# Patient Record
Sex: FEMALE | Race: WHITE | NOT HISPANIC OR LATINO | Employment: OTHER | URBAN - NONMETROPOLITAN AREA
[De-identification: names, ages, dates, MRNs, and addresses within clinical notes are randomized per-mention and may not be internally consistent; named-entity substitution may affect disease eponyms.]

---

## 2017-04-10 ENCOUNTER — DOCTOR'S OFFICE (OUTPATIENT)
Dept: URBAN - NONMETROPOLITAN AREA CLINIC 1 | Facility: CLINIC | Age: 82
Setting detail: OPHTHALMOLOGY
End: 2017-04-10
Payer: COMMERCIAL

## 2017-04-10 DIAGNOSIS — H35.3231: ICD-10-CM

## 2017-04-10 DIAGNOSIS — H04.121: ICD-10-CM

## 2017-04-10 DIAGNOSIS — H53.123: ICD-10-CM

## 2017-04-10 DIAGNOSIS — H35.053: ICD-10-CM

## 2017-04-10 DIAGNOSIS — H04.122: ICD-10-CM

## 2017-04-10 PROCEDURE — 92134 CPTRZ OPH DX IMG PST SGM RTA: CPT | Performed by: OPHTHALMOLOGY

## 2017-04-10 PROCEDURE — 92014 COMPRE OPH EXAM EST PT 1/>: CPT | Performed by: OPHTHALMOLOGY

## 2017-04-10 ASSESSMENT — REFRACTION_MANIFEST
OD_ADD: +3.00
OS_VA2: 20/
OD_VA2: 20/
OS_VA3: 20/
OS_SPHERE: PLANO
OS_VA2: 20/
OD_VA3: 20/
OS_AXIS: 090
OD_VA1: 20/40
OS_VA2: 20/60+2
OD_VA3: 20/
OS_VA1: 20/
OS_VA3: 20/
OD_VA2: 20/40
OD_VA1: 20/
OD_VA2: 20/
OU_VA: 20/
OU_VA: 20/
OD_VA3: 20/
OS_VA3: 20/
OS_VA1: 20/
OU_VA: 20/
OS_VA1: 20/60+2
OD_SPHERE: +0.25
OD_CYLINDER: -0.50
OS_CYLINDER: -0.50
OD_AXIS: 020
OD_VA1: 20/
OS_ADD: +3.00

## 2017-04-10 ASSESSMENT — REFRACTION_AUTOREFRACTION
OD_CYLINDER: -1.00
OS_CYLINDER: -0.75
OD_SPHERE: +1.00
OS_AXIS: 104
OS_SPHERE: -0.50
OD_AXIS: 20

## 2017-04-10 ASSESSMENT — VISUAL ACUITY
OS_BCVA: 20/50-2
OD_BCVA: 20/150

## 2017-04-10 ASSESSMENT — REFRACTION_CURRENTRX
OD_OVR_VA: 20/
OD_OVR_VA: 20/
OD_CYLINDER: -0.50
OS_OVR_VA: 20/
OS_AXIS: 180
OS_OVR_VA: 20/
OS_SPHERE: 0.00
OD_VPRISM_DIRECTION: BF
OS_VPRISM_DIRECTION: BF
OS_CYLINDER: 0.00
OS_OVR_VA: 20/
OD_OVR_VA: 20/
OD_SPHERE: +0.50
OD_AXIS: 30
OD_ADD: +2.75
OS_ADD: +2.75

## 2017-04-10 ASSESSMENT — SUPERFICIAL PUNCTATE KERATITIS (SPK)
OD_SPK: T
OS_SPK: T

## 2017-04-10 ASSESSMENT — LID POSITION - PTOSIS: OS_PTOSIS: +1

## 2017-04-10 ASSESSMENT — SPHEQUIV_DERIVED
OS_SPHEQUIV: -0.875
OD_SPHEQUIV: 0
OD_SPHEQUIV: 0.5

## 2017-04-10 ASSESSMENT — CONFRONTATIONAL VISUAL FIELD TEST (CVF)
OD_FINDINGS: FULL
OS_FINDINGS: FULL

## 2017-04-13 ENCOUNTER — DOCTOR'S OFFICE (OUTPATIENT)
Dept: URBAN - NONMETROPOLITAN AREA CLINIC 1 | Facility: CLINIC | Age: 82
Setting detail: OPHTHALMOLOGY
End: 2017-04-13
Payer: COMMERCIAL

## 2017-04-13 DIAGNOSIS — H53.123: ICD-10-CM

## 2017-04-13 PROCEDURE — 92083 EXTENDED VISUAL FIELD XM: CPT | Performed by: OPHTHALMOLOGY

## 2017-04-17 ENCOUNTER — DOCTOR'S OFFICE (OUTPATIENT)
Dept: URBAN - NONMETROPOLITAN AREA CLINIC 1 | Facility: CLINIC | Age: 82
Setting detail: OPHTHALMOLOGY
End: 2017-04-17
Payer: COMMERCIAL

## 2017-04-17 DIAGNOSIS — H43.811: ICD-10-CM

## 2017-04-17 DIAGNOSIS — H35.053: ICD-10-CM

## 2017-04-17 DIAGNOSIS — H43.812: ICD-10-CM

## 2017-04-17 DIAGNOSIS — H35.3231: ICD-10-CM

## 2017-04-17 DIAGNOSIS — H04.122: ICD-10-CM

## 2017-04-17 DIAGNOSIS — H04.121: ICD-10-CM

## 2017-04-17 PROCEDURE — 92235 FLUORESCEIN ANGRPH MLTIFRAME: CPT | Performed by: OPHTHALMOLOGY

## 2017-04-17 PROCEDURE — 92014 COMPRE OPH EXAM EST PT 1/>: CPT | Performed by: OPHTHALMOLOGY

## 2017-04-17 PROCEDURE — 92226 OPHTHALMOSCOPY EXT SUBSEQUENT: CPT | Performed by: OPHTHALMOLOGY

## 2017-04-17 PROCEDURE — 92250 FUNDUS PHOTOGRAPHY W/I&R: CPT | Performed by: OPHTHALMOLOGY

## 2017-04-17 ASSESSMENT — REFRACTION_MANIFEST
OS_CYLINDER: -0.50
OS_VA2: 20/60+2
OD_VA1: 20/40
OS_VA3: 20/
OS_AXIS: 090
OU_VA: 20/
OD_ADD: +3.00
OD_VA2: 20/
OD_AXIS: 020
OS_VA2: 20/
OS_VA3: 20/
OD_VA2: 20/40
OD_VA3: 20/
OS_ADD: +3.00
OS_SPHERE: PLANO
OU_VA: 20/
OD_VA2: 20/
OS_VA2: 20/
OD_CYLINDER: -0.50
OS_VA3: 20/
OS_VA1: 20/
OU_VA: 20/
OS_VA1: 20/60+2
OD_SPHERE: +0.25
OS_VA1: 20/
OD_VA1: 20/
OD_VA1: 20/
OD_VA3: 20/
OD_VA3: 20/

## 2017-04-17 ASSESSMENT — REFRACTION_CURRENTRX
OD_VPRISM_DIRECTION: BF
OS_VPRISM_DIRECTION: BF
OS_SPHERE: 0.00
OD_OVR_VA: 20/
OS_CYLINDER: 0.00
OS_OVR_VA: 20/
OD_OVR_VA: 20/
OD_CYLINDER: -0.50
OD_AXIS: 30
OD_ADD: +2.75
OS_OVR_VA: 20/
OS_ADD: +2.75
OD_OVR_VA: 20/
OS_OVR_VA: 20/
OD_SPHERE: +0.50
OS_AXIS: 180

## 2017-04-17 ASSESSMENT — LID POSITION - PTOSIS: OS_PTOSIS: +1

## 2017-04-17 ASSESSMENT — REFRACTION_AUTOREFRACTION
OD_AXIS: 20
OS_CYLINDER: -0.75
OS_AXIS: 104
OS_SPHERE: -0.50
OD_SPHERE: +1.00
OD_CYLINDER: -1.00

## 2017-04-17 ASSESSMENT — SPHEQUIV_DERIVED
OS_SPHEQUIV: -0.875
OD_SPHEQUIV: 0.5
OD_SPHEQUIV: 0

## 2017-04-17 ASSESSMENT — SUPERFICIAL PUNCTATE KERATITIS (SPK)
OS_SPK: T
OD_SPK: T

## 2017-04-17 ASSESSMENT — VISUAL ACUITY
OS_BCVA: 20/30-2
OD_BCVA: 20/80-1

## 2017-04-17 ASSESSMENT — CONFRONTATIONAL VISUAL FIELD TEST (CVF)
OD_FINDINGS: FULL
OS_FINDINGS: FULL

## 2017-05-18 ENCOUNTER — RX ONLY (RX ONLY)
Age: 82
End: 2017-05-18

## 2017-05-18 ENCOUNTER — DOCTOR'S OFFICE (OUTPATIENT)
Dept: URBAN - NONMETROPOLITAN AREA CLINIC 1 | Facility: CLINIC | Age: 82
Setting detail: OPHTHALMOLOGY
End: 2017-05-18
Payer: COMMERCIAL

## 2017-05-18 DIAGNOSIS — H35.053: ICD-10-CM

## 2017-05-18 DIAGNOSIS — H35.3231: ICD-10-CM

## 2017-05-18 DIAGNOSIS — H04.121: ICD-10-CM

## 2017-05-18 DIAGNOSIS — H43.811: ICD-10-CM

## 2017-05-18 DIAGNOSIS — H04.122: ICD-10-CM

## 2017-05-18 DIAGNOSIS — H53.123: ICD-10-CM

## 2017-05-18 DIAGNOSIS — H43.812: ICD-10-CM

## 2017-05-18 PROCEDURE — 92134 CPTRZ OPH DX IMG PST SGM RTA: CPT | Performed by: OPHTHALMOLOGY

## 2017-05-18 PROCEDURE — 92240 ICG ANGIOGRAPHY I&R UNI/BI: CPT | Performed by: OPHTHALMOLOGY

## 2017-05-18 PROCEDURE — 92014 COMPRE OPH EXAM EST PT 1/>: CPT | Performed by: OPHTHALMOLOGY

## 2017-05-18 ASSESSMENT — REFRACTION_MANIFEST
OD_VA2: 20/
OU_VA: 20/
OS_VA1: 20/
OS_VA2: 20/
OS_AXIS: 090
OD_VA2: 20/40
OD_VA1: 20/
OS_VA3: 20/
OS_VA1: 20/
OS_VA1: 20/60+2
OS_CYLINDER: -0.50
OD_AXIS: 020
OS_VA3: 20/
OS_VA3: 20/
OS_VA2: 20/
OD_VA3: 20/
OD_SPHERE: +0.25
OD_VA2: 20/
OS_ADD: +3.00
OD_VA3: 20/
OD_VA1: 20/40
OD_CYLINDER: -0.50
OU_VA: 20/
OD_ADD: +3.00
OU_VA: 20/
OD_VA1: 20/
OD_VA3: 20/
OS_VA2: 20/60+2
OS_SPHERE: PLANO

## 2017-05-18 ASSESSMENT — REFRACTION_CURRENTRX
OS_OVR_VA: 20/
OD_SPHERE: +0.50
OD_VPRISM_DIRECTION: BF
OD_ADD: +2.75
OS_VPRISM_DIRECTION: BF
OS_OVR_VA: 20/
OD_AXIS: 30
OD_CYLINDER: -0.50
OS_CYLINDER: 0.00
OD_OVR_VA: 20/
OS_ADD: +2.75
OS_OVR_VA: 20/
OD_OVR_VA: 20/
OS_SPHERE: 0.00
OS_AXIS: 180
OD_OVR_VA: 20/

## 2017-05-18 ASSESSMENT — CONFRONTATIONAL VISUAL FIELD TEST (CVF)
OD_FINDINGS: FULL
OS_FINDINGS: FULL

## 2017-05-18 ASSESSMENT — LID POSITION - PTOSIS: OS_PTOSIS: +1

## 2017-05-18 ASSESSMENT — REFRACTION_AUTOREFRACTION
OS_SPHERE: -0.50
OS_CYLINDER: -0.75
OD_CYLINDER: -1.00
OS_AXIS: 104
OD_SPHERE: +1.00
OD_AXIS: 20

## 2017-05-18 ASSESSMENT — SPHEQUIV_DERIVED
OD_SPHEQUIV: 0.5
OS_SPHEQUIV: -0.875
OD_SPHEQUIV: 0

## 2017-05-18 ASSESSMENT — SUPERFICIAL PUNCTATE KERATITIS (SPK)
OS_SPK: T
OD_SPK: T

## 2017-05-18 ASSESSMENT — VISUAL ACUITY
OD_BCVA: 20/200
OS_BCVA: 20/40-2

## 2017-05-19 ENCOUNTER — DOCTOR'S OFFICE (OUTPATIENT)
Dept: URBAN - NONMETROPOLITAN AREA CLINIC 1 | Facility: CLINIC | Age: 82
Setting detail: OPHTHALMOLOGY
End: 2017-05-19
Payer: COMMERCIAL

## 2017-05-19 DIAGNOSIS — H35.053: ICD-10-CM

## 2017-05-19 DIAGNOSIS — H35.3231: ICD-10-CM

## 2017-05-19 PROCEDURE — 67028 INJECTION EYE DRUG: CPT | Performed by: OPHTHALMOLOGY

## 2017-05-19 ASSESSMENT — VISUAL ACUITY
OS_BCVA: 20/40-2
OD_BCVA: 20/200

## 2017-05-19 ASSESSMENT — REFRACTION_MANIFEST
OU_VA: 20/
OD_VA1: 20/40
OD_VA3: 20/
OD_CYLINDER: -0.50
OU_VA: 20/
OD_SPHERE: +0.25
OD_VA1: 20/
OS_VA1: 20/60+2
OS_VA3: 20/
OD_VA3: 20/
OS_AXIS: 090
OS_VA1: 20/
OD_VA3: 20/
OD_AXIS: 020
OS_SPHERE: PLANO
OD_VA2: 20/
OD_VA2: 20/40
OS_VA1: 20/
OD_VA1: 20/
OS_VA3: 20/
OD_VA2: 20/
OD_ADD: +3.00
OU_VA: 20/
OS_VA2: 20/60+2
OS_VA2: 20/
OS_VA3: 20/
OS_VA2: 20/
OS_CYLINDER: -0.50
OS_ADD: +3.00

## 2017-05-19 ASSESSMENT — REFRACTION_CURRENTRX
OD_OVR_VA: 20/
OD_OVR_VA: 20/
OS_SPHERE: 0.00
OS_AXIS: 180
OS_CYLINDER: 0.00
OD_VPRISM_DIRECTION: BF
OS_ADD: +2.75
OD_AXIS: 30
OS_OVR_VA: 20/
OD_ADD: +2.75
OS_OVR_VA: 20/
OS_OVR_VA: 20/
OD_OVR_VA: 20/
OD_CYLINDER: -0.50
OD_SPHERE: +0.50
OS_VPRISM_DIRECTION: BF

## 2017-05-19 ASSESSMENT — REFRACTION_AUTOREFRACTION
OD_CYLINDER: -1.00
OS_SPHERE: -0.50
OD_AXIS: 20
OD_SPHERE: +1.00
OS_CYLINDER: -0.75
OS_AXIS: 104

## 2017-05-19 ASSESSMENT — SPHEQUIV_DERIVED
OS_SPHEQUIV: -0.875
OD_SPHEQUIV: 0
OD_SPHEQUIV: 0.5

## 2017-06-19 ENCOUNTER — DOCTOR'S OFFICE (OUTPATIENT)
Dept: URBAN - NONMETROPOLITAN AREA CLINIC 1 | Facility: CLINIC | Age: 82
Setting detail: OPHTHALMOLOGY
End: 2017-06-19
Payer: COMMERCIAL

## 2017-06-19 DIAGNOSIS — H35.3231: ICD-10-CM

## 2017-06-19 DIAGNOSIS — H35.053: ICD-10-CM

## 2017-06-19 PROCEDURE — 92014 COMPRE OPH EXAM EST PT 1/>: CPT | Performed by: OPHTHALMOLOGY

## 2017-06-19 PROCEDURE — 92134 CPTRZ OPH DX IMG PST SGM RTA: CPT | Performed by: OPHTHALMOLOGY

## 2017-06-19 ASSESSMENT — REFRACTION_MANIFEST
OD_CYLINDER: -0.50
OS_SPHERE: PLANO
OS_VA3: 20/
OD_ADD: +3.00
OS_VA3: 20/
OU_VA: 20/
OD_VA1: 20/40
OD_VA2: 20/40
OU_VA: 20/
OS_VA3: 20/
OD_VA1: 20/
OS_ADD: +3.00
OS_VA1: 20/60+2
OS_VA2: 20/
OS_CYLINDER: -0.50
OS_VA1: 20/
OS_VA1: 20/
OS_VA2: 20/
OD_VA3: 20/
OD_VA3: 20/
OD_VA1: 20/
OD_VA2: 20/
OS_VA2: 20/60+2
OD_SPHERE: +0.25
OS_AXIS: 090
OD_VA3: 20/
OU_VA: 20/
OD_AXIS: 020
OD_VA2: 20/

## 2017-06-19 ASSESSMENT — REFRACTION_CURRENTRX
OS_AXIS: 180
OD_OVR_VA: 20/
OS_OVR_VA: 20/
OS_OVR_VA: 20/
OS_ADD: +2.75
OS_VPRISM_DIRECTION: BF
OD_CYLINDER: -0.50
OD_VPRISM_DIRECTION: BF
OD_OVR_VA: 20/
OD_AXIS: 30
OS_OVR_VA: 20/
OD_ADD: +2.75
OS_SPHERE: 0.00
OD_OVR_VA: 20/
OD_SPHERE: +0.50
OS_CYLINDER: 0.00

## 2017-06-19 ASSESSMENT — LID POSITION - PTOSIS: OS_PTOSIS: +1

## 2017-06-19 ASSESSMENT — REFRACTION_AUTOREFRACTION
OD_SPHERE: +1.00
OS_AXIS: 104
OS_CYLINDER: -0.75
OD_CYLINDER: -1.00
OD_AXIS: 20
OS_SPHERE: -0.50

## 2017-06-19 ASSESSMENT — SUPERFICIAL PUNCTATE KERATITIS (SPK)
OD_SPK: T
OS_SPK: T

## 2017-06-19 ASSESSMENT — SPHEQUIV_DERIVED
OS_SPHEQUIV: -0.875
OD_SPHEQUIV: 0.5
OD_SPHEQUIV: 0

## 2017-06-19 ASSESSMENT — CONFRONTATIONAL VISUAL FIELD TEST (CVF)
OD_FINDINGS: FULL
OS_FINDINGS: FULL

## 2017-06-19 ASSESSMENT — VISUAL ACUITY
OD_BCVA: 20/60
OS_BCVA: 20/25

## 2017-06-22 ENCOUNTER — DOCTOR'S OFFICE (OUTPATIENT)
Dept: URBAN - NONMETROPOLITAN AREA CLINIC 1 | Facility: CLINIC | Age: 82
Setting detail: OPHTHALMOLOGY
End: 2017-06-22
Payer: COMMERCIAL

## 2017-06-22 DIAGNOSIS — H35.3221: ICD-10-CM

## 2017-06-22 PROCEDURE — 67028 INJECTION EYE DRUG: CPT | Performed by: OPHTHALMOLOGY

## 2017-06-22 ASSESSMENT — REFRACTION_AUTOREFRACTION
OS_CYLINDER: -0.75
OD_CYLINDER: -1.00
OS_AXIS: 104
OD_AXIS: 20
OS_SPHERE: -0.50
OD_SPHERE: +1.00

## 2017-06-22 ASSESSMENT — SPHEQUIV_DERIVED
OS_SPHEQUIV: -0.875
OD_SPHEQUIV: 0.5

## 2017-06-22 ASSESSMENT — VISUAL ACUITY
OD_BCVA: 20/60
OS_BCVA: 20/25

## 2017-07-21 ENCOUNTER — DOCTOR'S OFFICE (OUTPATIENT)
Dept: URBAN - NONMETROPOLITAN AREA CLINIC 1 | Facility: CLINIC | Age: 82
Setting detail: OPHTHALMOLOGY
End: 2017-07-21
Payer: COMMERCIAL

## 2017-07-21 DIAGNOSIS — H35.3221: ICD-10-CM

## 2017-07-21 DIAGNOSIS — H35.3211: ICD-10-CM

## 2017-07-21 DIAGNOSIS — H43.812: ICD-10-CM

## 2017-07-21 DIAGNOSIS — H35.053: ICD-10-CM

## 2017-07-21 DIAGNOSIS — H43.811: ICD-10-CM

## 2017-07-21 PROCEDURE — 92226 OPHTHALMOSCOPY EXT SUBSEQUENT: CPT | Performed by: OPHTHALMOLOGY

## 2017-07-21 PROCEDURE — 92014 COMPRE OPH EXAM EST PT 1/>: CPT | Performed by: OPHTHALMOLOGY

## 2017-07-21 PROCEDURE — 92134 CPTRZ OPH DX IMG PST SGM RTA: CPT | Performed by: OPHTHALMOLOGY

## 2017-07-21 ASSESSMENT — REFRACTION_MANIFEST
OS_VA2: 20/
OS_VA3: 20/
OS_VA1: 20/
OS_VA3: 20/
OD_VA3: 20/
OS_VA2: 20/
OD_VA2: 20/
OD_VA2: 20/
OD_VA1: 20/
OS_VA2: 20/
OS_VA1: 20/
OU_VA: 20/
OU_VA: 20/
OD_VA1: 20/
OU_VA: 20/
OS_VA1: 20/
OD_VA3: 20/
OD_VA3: 20/
OS_VA3: 20/
OD_VA2: 20/
OD_VA1: 20/

## 2017-07-21 ASSESSMENT — REFRACTION_CURRENTRX
OS_OVR_VA: 20/
OD_OVR_VA: 20/

## 2017-07-21 ASSESSMENT — REFRACTION_AUTOREFRACTION
OS_CYLINDER: -0.75
OD_CYLINDER: -1.00
OD_AXIS: 20
OD_SPHERE: +1.00
OS_AXIS: 104
OS_SPHERE: -0.50

## 2017-07-21 ASSESSMENT — VISUAL ACUITY
OS_BCVA: 20/50+2
OD_BCVA: 20/200-1

## 2017-07-21 ASSESSMENT — SPHEQUIV_DERIVED
OS_SPHEQUIV: -0.875
OD_SPHEQUIV: 0.5

## 2017-07-21 ASSESSMENT — LID POSITION - PTOSIS: OS_PTOSIS: +1

## 2017-07-21 ASSESSMENT — CONFRONTATIONAL VISUAL FIELD TEST (CVF)
OS_FINDINGS: FULL
OD_FINDINGS: FULL

## 2017-07-21 ASSESSMENT — SUPERFICIAL PUNCTATE KERATITIS (SPK)
OD_SPK: T
OS_SPK: T

## 2017-07-24 ENCOUNTER — DOCTOR'S OFFICE (OUTPATIENT)
Dept: URBAN - NONMETROPOLITAN AREA CLINIC 1 | Facility: CLINIC | Age: 82
Setting detail: OPHTHALMOLOGY
End: 2017-07-24
Payer: COMMERCIAL

## 2017-07-24 DIAGNOSIS — H35.3221: ICD-10-CM

## 2017-07-24 PROCEDURE — 67028 INJECTION EYE DRUG: CPT | Performed by: OPHTHALMOLOGY

## 2017-07-24 ASSESSMENT — REFRACTION_AUTOREFRACTION
OD_AXIS: 20
OD_SPHERE: +1.00
OD_CYLINDER: -1.00
OS_SPHERE: -0.50
OS_CYLINDER: -0.75
OS_AXIS: 104

## 2017-07-24 ASSESSMENT — VISUAL ACUITY
OD_BCVA: 20/200-1
OS_BCVA: 20/50+2

## 2017-07-24 ASSESSMENT — SPHEQUIV_DERIVED
OD_SPHEQUIV: 0.5
OS_SPHEQUIV: -0.875

## 2017-09-14 ENCOUNTER — DOCTOR'S OFFICE (OUTPATIENT)
Dept: URBAN - NONMETROPOLITAN AREA CLINIC 1 | Facility: CLINIC | Age: 82
Setting detail: OPHTHALMOLOGY
End: 2017-09-14
Payer: COMMERCIAL

## 2017-09-14 DIAGNOSIS — H35.053: ICD-10-CM

## 2017-09-14 DIAGNOSIS — H43.812: ICD-10-CM

## 2017-09-14 DIAGNOSIS — H43.813: ICD-10-CM

## 2017-09-14 DIAGNOSIS — H35.3231: ICD-10-CM

## 2017-09-14 DIAGNOSIS — H43.811: ICD-10-CM

## 2017-09-14 PROCEDURE — 92134 CPTRZ OPH DX IMG PST SGM RTA: CPT | Performed by: OPHTHALMOLOGY

## 2017-09-14 PROCEDURE — 92226 OPHTHALMOSCOPY EXT SUBSEQUENT: CPT | Performed by: OPHTHALMOLOGY

## 2017-09-14 PROCEDURE — 99214 OFFICE O/P EST MOD 30 MIN: CPT | Performed by: OPHTHALMOLOGY

## 2017-09-14 ASSESSMENT — CONFRONTATIONAL VISUAL FIELD TEST (CVF)
OS_FINDINGS: FULL
OD_FINDINGS: FULL

## 2017-09-14 ASSESSMENT — SUPERFICIAL PUNCTATE KERATITIS (SPK)
OD_SPK: T
OS_SPK: T

## 2017-09-14 ASSESSMENT — LID POSITION - PTOSIS: OS_PTOSIS: +1

## 2017-09-15 ENCOUNTER — DOCTOR'S OFFICE (OUTPATIENT)
Dept: URBAN - NONMETROPOLITAN AREA CLINIC 1 | Facility: CLINIC | Age: 82
Setting detail: OPHTHALMOLOGY
End: 2017-09-15
Payer: COMMERCIAL

## 2017-09-15 DIAGNOSIS — H35.3221: ICD-10-CM

## 2017-09-15 DIAGNOSIS — H35.3231: ICD-10-CM

## 2017-09-15 PROCEDURE — 67028 INJECTION EYE DRUG: CPT | Performed by: OPHTHALMOLOGY

## 2017-09-15 PROCEDURE — 92235 FLUORESCEIN ANGRPH MLTIFRAME: CPT | Performed by: OPHTHALMOLOGY

## 2017-09-15 PROCEDURE — 92250 FUNDUS PHOTOGRAPHY W/I&R: CPT | Performed by: OPHTHALMOLOGY

## 2017-09-15 ASSESSMENT — SPHEQUIV_DERIVED
OD_SPHEQUIV: 0.5
OS_SPHEQUIV: -0.875
OD_SPHEQUIV: 0.5
OD_SPHEQUIV: 0.5

## 2017-09-15 ASSESSMENT — REFRACTION_MANIFEST
OU_VA: 20/
OD_VA1: 20/
OS_VA3: 20/
OS_VA2: 20/
OS_VA1: 20/
OU_VA: 20/
OS_VA2: 20/
OS_VA1: 20/
OD_VA2: 20/
OS_VA3: 20/
OS_VA3: 20/
OD_VA3: 20/
OS_VA1: 20/
OD_VA3: 20/
OU_VA: 20/
OD_VA2: 20/
OS_VA2: 20/
OD_VA1: 20/
OD_VA1: 20/
OS_VA1: 20/
OS_VA2: 20/
OD_VA1: 20/
OD_VA2: 20/
OS_VA2: 20/
OS_VA1: 20/
OU_VA: 20/
OD_VA2: 20/
OS_VA3: 20/
OD_VA1: 20/
OD_VA3: 20/
OS_VA3: 20/
OU_VA: 20/
OD_VA1: 20/
OS_VA1: 20/
OD_VA2: 20/
OD_VA2: 20/
OS_VA3: 20/
OS_VA2: 20/
OD_VA3: 20/
OU_VA: 20/

## 2017-09-15 ASSESSMENT — REFRACTION_AUTOREFRACTION
OS_CYLINDER: -0.75
OS_CYLINDER: -0.75
OD_SPHERE: +1.00
OD_CYLINDER: -1.00
OS_SPHERE: -0.50
OS_CYLINDER: -0.75
OD_SPHERE: +1.00
OD_AXIS: 20
OD_AXIS: 20
OS_SPHERE: -0.50
OD_SPHERE: +1.00
OS_SPHERE: -0.50
OS_AXIS: 104
OD_AXIS: 20
OD_CYLINDER: -1.00
OS_AXIS: 104
OS_AXIS: 104
OD_CYLINDER: -1.00

## 2017-09-15 ASSESSMENT — REFRACTION_CURRENTRX
OS_OVR_VA: 20/
OD_OVR_VA: 20/
OS_OVR_VA: 20/
OD_OVR_VA: 20/
OS_OVR_VA: 20/
OD_OVR_VA: 20/
OD_OVR_VA: 20/
OS_OVR_VA: 20/
OS_OVR_VA: 20/
OD_OVR_VA: 20/
OD_OVR_VA: 20/
OS_OVR_VA: 20/

## 2017-09-15 ASSESSMENT — VISUAL ACUITY
OD_BCVA: CF 6FT
OD_BCVA: CF 6FT
OS_BCVA: 20/60+2
OD_BCVA: CF 6FT
OS_BCVA: 20/60+2
OS_BCVA: 20/60+2

## 2018-03-30 ENCOUNTER — DOCTOR'S OFFICE (OUTPATIENT)
Dept: URBAN - NONMETROPOLITAN AREA CLINIC 1 | Facility: CLINIC | Age: 83
Setting detail: OPHTHALMOLOGY
End: 2018-03-30
Payer: COMMERCIAL

## 2018-03-30 DIAGNOSIS — H35.3231: ICD-10-CM

## 2018-03-30 DIAGNOSIS — H43.813: ICD-10-CM

## 2018-03-30 DIAGNOSIS — H35.053: ICD-10-CM

## 2018-03-30 DIAGNOSIS — H43.812: ICD-10-CM

## 2018-03-30 DIAGNOSIS — H04.123: ICD-10-CM

## 2018-03-30 DIAGNOSIS — H43.811: ICD-10-CM

## 2018-03-30 PROCEDURE — 92226 OPHTHALMOSCOPY EXT SUBSEQUENT: CPT | Performed by: OPHTHALMOLOGY

## 2018-03-30 PROCEDURE — 92134 CPTRZ OPH DX IMG PST SGM RTA: CPT | Performed by: OPHTHALMOLOGY

## 2018-03-30 PROCEDURE — 99214 OFFICE O/P EST MOD 30 MIN: CPT | Performed by: OPHTHALMOLOGY

## 2018-03-30 ASSESSMENT — VISUAL ACUITY
OS_BCVA: 20/80
OD_BCVA: CF 6FT

## 2018-03-30 ASSESSMENT — CONFRONTATIONAL VISUAL FIELD TEST (CVF)
OD_FINDINGS: FULL
OS_FINDINGS: FULL

## 2018-03-30 ASSESSMENT — REFRACTION_MANIFEST
OS_VA2: 20/
OD_VA3: 20/
OS_VA1: 20/
OD_VA1: 20/
OS_VA2: 20/
OD_VA3: 20/
OU_VA: 20/
OS_VA2: 20/
OD_VA1: 20/
OS_VA1: 20/
OU_VA: 20/
OD_VA2: 20/
OD_VA1: 20/
OS_VA3: 20/
OU_VA: 20/
OS_VA3: 20/
OS_VA1: 20/
OS_VA3: 20/
OD_VA3: 20/
OD_VA2: 20/
OD_VA2: 20/

## 2018-03-30 ASSESSMENT — REFRACTION_CURRENTRX
OS_OVR_VA: 20/
OD_OVR_VA: 20/
OD_OVR_VA: 20/
OS_OVR_VA: 20/
OD_OVR_VA: 20/
OS_OVR_VA: 20/

## 2018-03-30 ASSESSMENT — REFRACTION_AUTOREFRACTION
OD_CYLINDER: -1.00
OS_CYLINDER: -0.75
OD_AXIS: 20
OS_AXIS: 104
OS_SPHERE: -0.50
OD_SPHERE: +1.00

## 2018-03-30 ASSESSMENT — SPHEQUIV_DERIVED
OS_SPHEQUIV: -0.875
OD_SPHEQUIV: 0.5

## 2018-03-30 ASSESSMENT — SUPERFICIAL PUNCTATE KERATITIS (SPK)
OD_SPK: T
OS_SPK: T

## 2018-03-30 ASSESSMENT — LID POSITION - PTOSIS: OS_PTOSIS: +1

## 2018-04-06 ENCOUNTER — DOCTOR'S OFFICE (OUTPATIENT)
Dept: URBAN - NONMETROPOLITAN AREA CLINIC 1 | Facility: CLINIC | Age: 83
Setting detail: OPHTHALMOLOGY
End: 2018-04-06
Payer: COMMERCIAL

## 2018-04-06 DIAGNOSIS — H35.3231: ICD-10-CM

## 2018-04-06 DIAGNOSIS — H35.3221: ICD-10-CM

## 2018-04-06 DIAGNOSIS — H35.053: ICD-10-CM

## 2018-04-06 PROCEDURE — 92235 FLUORESCEIN ANGRPH MLTIFRAME: CPT | Performed by: OPHTHALMOLOGY

## 2018-04-06 PROCEDURE — 67028 INJECTION EYE DRUG: CPT | Performed by: OPHTHALMOLOGY

## 2018-04-06 PROCEDURE — 92250 FUNDUS PHOTOGRAPHY W/I&R: CPT | Performed by: OPHTHALMOLOGY

## 2018-04-06 ASSESSMENT — LID POSITION - PTOSIS: OS_PTOSIS: +1

## 2018-04-06 ASSESSMENT — REFRACTION_CURRENTRX
OS_OVR_VA: 20/
OD_OVR_VA: 20/
OS_OVR_VA: 20/
OS_OVR_VA: 20/

## 2018-04-06 ASSESSMENT — REFRACTION_MANIFEST
OS_VA2: 20/
OD_VA2: 20/
OU_VA: 20/
OS_VA2: 20/
OD_VA3: 20/
OS_VA2: 20/
OS_VA3: 20/
OD_VA2: 20/
OS_VA1: 20/
OS_VA3: 20/
OD_VA3: 20/
OS_VA1: 20/
OU_VA: 20/
OD_VA1: 20/
OD_VA3: 20/
OD_VA1: 20/
OU_VA: 20/
OD_VA1: 20/
OS_VA1: 20/
OD_VA2: 20/
OS_VA3: 20/

## 2018-04-06 ASSESSMENT — REFRACTION_AUTOREFRACTION
OD_AXIS: 20
OS_SPHERE: -0.50
OD_SPHERE: +1.00
OS_AXIS: 104
OS_CYLINDER: -0.75
OD_CYLINDER: -1.00

## 2018-04-06 ASSESSMENT — VISUAL ACUITY
OS_BCVA: 20/80
OD_BCVA: CF 6FT

## 2018-04-06 ASSESSMENT — SPHEQUIV_DERIVED
OD_SPHEQUIV: 0.5
OS_SPHEQUIV: -0.875

## 2018-04-06 ASSESSMENT — SUPERFICIAL PUNCTATE KERATITIS (SPK)
OS_SPK: T
OD_SPK: T

## 2018-04-06 ASSESSMENT — CONFRONTATIONAL VISUAL FIELD TEST (CVF)
OD_FINDINGS: FULL
OS_FINDINGS: FULL

## 2018-05-18 ENCOUNTER — DOCTOR'S OFFICE (OUTPATIENT)
Dept: URBAN - NONMETROPOLITAN AREA CLINIC 1 | Facility: CLINIC | Age: 83
Setting detail: OPHTHALMOLOGY
End: 2018-05-18
Payer: COMMERCIAL

## 2018-05-18 DIAGNOSIS — H43.811: ICD-10-CM

## 2018-05-18 DIAGNOSIS — H35.053: ICD-10-CM

## 2018-05-18 DIAGNOSIS — H43.813: ICD-10-CM

## 2018-05-18 DIAGNOSIS — H43.812: ICD-10-CM

## 2018-05-18 DIAGNOSIS — H04.123: ICD-10-CM

## 2018-05-18 DIAGNOSIS — H35.3231: ICD-10-CM

## 2018-05-18 PROCEDURE — 92014 COMPRE OPH EXAM EST PT 1/>: CPT | Performed by: OPHTHALMOLOGY

## 2018-05-18 PROCEDURE — 92226 OPHTHALMOSCOPY EXT SUBSEQUENT: CPT | Performed by: OPHTHALMOLOGY

## 2018-05-18 PROCEDURE — 92134 CPTRZ OPH DX IMG PST SGM RTA: CPT | Performed by: OPHTHALMOLOGY

## 2018-05-18 ASSESSMENT — REFRACTION_MANIFEST
OD_VA2: 20/
OD_VA3: 20/
OD_VA1: 20/
OS_VA2: 20/
OD_VA3: 20/
OD_VA3: 20/
OS_VA2: 20/
OS_VA3: 20/
OD_VA2: 20/
OS_VA2: 20/
OS_VA3: 20/
OU_VA: 20/
OD_VA1: 20/
OS_VA1: 20/
OU_VA: 20/
OS_VA1: 20/
OS_VA3: 20/
OU_VA: 20/
OS_VA1: 20/
OD_VA1: 20/
OD_VA2: 20/

## 2018-05-18 ASSESSMENT — REFRACTION_AUTOREFRACTION
OS_AXIS: 104
OD_AXIS: 20
OS_CYLINDER: -0.75
OS_SPHERE: -0.50
OD_SPHERE: +1.00
OD_CYLINDER: -1.00

## 2018-05-18 ASSESSMENT — VISUAL ACUITY
OS_BCVA: 20/60+2
OD_BCVA: CF 6FT

## 2018-05-18 ASSESSMENT — SPHEQUIV_DERIVED
OS_SPHEQUIV: -0.875
OD_SPHEQUIV: 0.5

## 2018-05-18 ASSESSMENT — REFRACTION_CURRENTRX
OD_OVR_VA: 20/
OS_OVR_VA: 20/
OD_OVR_VA: 20/
OS_OVR_VA: 20/
OS_OVR_VA: 20/
OD_OVR_VA: 20/

## 2018-05-18 ASSESSMENT — CONFRONTATIONAL VISUAL FIELD TEST (CVF)
OS_FINDINGS: FULL
OD_FINDINGS: FULL

## 2018-05-18 ASSESSMENT — LID POSITION - PTOSIS: OS_PTOSIS: +1

## 2018-05-18 ASSESSMENT — SUPERFICIAL PUNCTATE KERATITIS (SPK)
OD_SPK: T
OS_SPK: T

## 2018-06-01 ENCOUNTER — DOCTOR'S OFFICE (OUTPATIENT)
Dept: URBAN - NONMETROPOLITAN AREA CLINIC 1 | Facility: CLINIC | Age: 83
Setting detail: OPHTHALMOLOGY
End: 2018-06-01
Payer: COMMERCIAL

## 2018-06-01 DIAGNOSIS — H35.3231: ICD-10-CM

## 2018-06-01 DIAGNOSIS — H35.3221: ICD-10-CM

## 2018-06-01 PROCEDURE — 92240 ICG ANGIOGRAPHY I&R UNI/BI: CPT | Performed by: OPHTHALMOLOGY

## 2018-06-01 PROCEDURE — 67028 INJECTION EYE DRUG: CPT | Performed by: OPHTHALMOLOGY

## 2018-06-01 ASSESSMENT — REFRACTION_AUTOREFRACTION
OS_CYLINDER: -0.75
OD_CYLINDER: -1.00
OS_AXIS: 104
OD_SPHERE: +1.00
OS_SPHERE: -0.50
OD_AXIS: 20

## 2018-06-01 ASSESSMENT — SPHEQUIV_DERIVED
OS_SPHEQUIV: -0.875
OD_SPHEQUIV: 0.5

## 2018-06-01 ASSESSMENT — VISUAL ACUITY
OD_BCVA: CF 6FT
OS_BCVA: 20/60+2

## 2018-06-25 ENCOUNTER — DOCTOR'S OFFICE (OUTPATIENT)
Dept: URBAN - NONMETROPOLITAN AREA CLINIC 1 | Facility: CLINIC | Age: 83
Setting detail: OPHTHALMOLOGY
End: 2018-06-25
Payer: COMMERCIAL

## 2018-06-25 DIAGNOSIS — H52.4: ICD-10-CM

## 2018-06-25 PROCEDURE — 92015 DETERMINE REFRACTIVE STATE: CPT | Performed by: OPTOMETRIST

## 2018-06-25 ASSESSMENT — REFRACTION_MANIFEST
OS_VA1: 20/
OD_VA2: 20/
OS_VA3: 20/
OD_VA1: 20/
OD_VA2: 20/
OD_VA3: 20/
OS_VA3: 20/
OD_VA1: 20/
OU_VA: 20/
OS_VA2: 20/
OS_VA1: 20/
OS_VA2: 20/
OU_VA: 20/
OD_VA3: 20/

## 2018-06-25 ASSESSMENT — REFRACTION_OUTSIDERX
OD_VA2: 20/60
OD_AXIS: 020
OD_SPHERE: +0.75
OS_VA3: 20/
OS_VA2: 20/400
OS_VA1: 20/400
OD_ADD: +3.00
OD_VA3: 20/
OS_ADD: +3.00
OS_SPHERE: BALANCE
OU_VA: 20/
OD_CYLINDER: -0.75
OD_VA1: 20/60

## 2018-06-25 ASSESSMENT — REFRACTION_CURRENTRX
OS_CYLINDER: 0.00
OS_SPHERE: PLANO
OS_AXIS: 180
OD_SPHERE: +0.50
OS_OVR_VA: 20/
OD_OVR_VA: 20/
OD_OVR_VA: 20/
OS_ADD: +3.00
OS_VPRISM_DIRECTION: BF
OD_AXIS: 023
OD_CYLINDER: -0.50
OS_OVR_VA: 20/
OD_VPRISM_DIRECTION: BF
OS_OVR_VA: 20/
OD_OVR_VA: 20/
OD_ADD: +3.00

## 2018-06-25 ASSESSMENT — REFRACTION_AUTOREFRACTION
OD_CYLINDER: -1.00
OS_AXIS: 104
OS_CYLINDER: -0.75
OD_AXIS: 20
OS_SPHERE: -0.50
OD_SPHERE: +1.00

## 2018-06-25 ASSESSMENT — VISUAL ACUITY
OD_BCVA: 20/400
OS_BCVA: 20/60

## 2018-06-25 ASSESSMENT — SPHEQUIV_DERIVED
OD_SPHEQUIV: 0.5
OS_SPHEQUIV: -0.875

## 2018-07-12 ENCOUNTER — DOCTOR'S OFFICE (OUTPATIENT)
Dept: URBAN - NONMETROPOLITAN AREA CLINIC 1 | Facility: CLINIC | Age: 83
Setting detail: OPHTHALMOLOGY
End: 2018-07-12
Payer: COMMERCIAL

## 2018-07-12 ENCOUNTER — RX ONLY (RX ONLY)
Age: 83
End: 2018-07-12

## 2018-07-12 DIAGNOSIS — H43.813: ICD-10-CM

## 2018-07-12 DIAGNOSIS — H35.053: ICD-10-CM

## 2018-07-12 DIAGNOSIS — H35.3231: ICD-10-CM

## 2018-07-12 DIAGNOSIS — H04.123: ICD-10-CM

## 2018-07-12 DIAGNOSIS — H43.812: ICD-10-CM

## 2018-07-12 DIAGNOSIS — H43.811: ICD-10-CM

## 2018-07-12 DIAGNOSIS — Z96.1: ICD-10-CM

## 2018-07-12 PROCEDURE — 92226 OPHTHALMOSCOPY EXT SUBSEQUENT: CPT | Performed by: OPHTHALMOLOGY

## 2018-07-12 PROCEDURE — 92134 CPTRZ OPH DX IMG PST SGM RTA: CPT | Performed by: OPHTHALMOLOGY

## 2018-07-12 PROCEDURE — 92014 COMPRE OPH EXAM EST PT 1/>: CPT | Performed by: OPHTHALMOLOGY

## 2018-07-12 ASSESSMENT — REFRACTION_MANIFEST
OS_VA2: 20/
OS_VA1: 20/
OD_VA1: 20/
OS_VA3: 20/
OD_VA3: 20/
OU_VA: 20/
OS_VA2: 20/
OD_VA1: 20/
OS_VA1: 20/
OU_VA: 20/
OS_VA3: 20/
OD_VA3: 20/
OD_VA2: 20/
OD_VA2: 20/

## 2018-07-12 ASSESSMENT — REFRACTION_CURRENTRX
OD_OVR_VA: 20/
OS_OVR_VA: 20/
OD_AXIS: 023
OD_OVR_VA: 20/
OD_SPHERE: +0.50
OS_OVR_VA: 20/
OD_VPRISM_DIRECTION: BF
OS_CYLINDER: 0.00
OS_OVR_VA: 20/
OS_SPHERE: PLANO
OS_AXIS: 180
OD_ADD: +3.00
OD_OVR_VA: 20/
OD_CYLINDER: -0.50
OS_VPRISM_DIRECTION: BF
OS_ADD: +3.00

## 2018-07-12 ASSESSMENT — REFRACTION_AUTOREFRACTION
OD_AXIS: 20
OS_AXIS: 104
OS_SPHERE: -0.50
OD_SPHERE: +1.00
OS_CYLINDER: -0.75
OD_CYLINDER: -1.00

## 2018-07-12 ASSESSMENT — REFRACTION_OUTSIDERX
OS_VA1: 20/400
OD_VA1: 20/60
OD_ADD: +3.00
OD_CYLINDER: -0.75
OD_VA2: 20/60
OU_VA: 20/
OS_VA2: 20/400
OD_VA3: 20/
OS_SPHERE: BALANCE
OD_AXIS: 020
OS_ADD: +3.00
OD_SPHERE: +0.75
OS_VA3: 20/

## 2018-07-12 ASSESSMENT — SPHEQUIV_DERIVED
OS_SPHEQUIV: -0.875
OD_SPHEQUIV: 0.5

## 2018-07-12 ASSESSMENT — SUPERFICIAL PUNCTATE KERATITIS (SPK)
OD_SPK: T
OS_SPK: T

## 2018-07-12 ASSESSMENT — VISUAL ACUITY
OD_BCVA: 20/400
OS_BCVA: 20/60-1

## 2018-07-12 ASSESSMENT — LID POSITION - PTOSIS: OS_PTOSIS: +1

## 2018-07-12 ASSESSMENT — CONFRONTATIONAL VISUAL FIELD TEST (CVF)
OS_FINDINGS: FULL
OD_FINDINGS: FULL

## 2018-07-19 ENCOUNTER — RX ONLY (RX ONLY)
Age: 83
End: 2018-07-19

## 2018-07-19 ENCOUNTER — DOCTOR'S OFFICE (OUTPATIENT)
Dept: URBAN - NONMETROPOLITAN AREA CLINIC 1 | Facility: CLINIC | Age: 83
Setting detail: OPHTHALMOLOGY
End: 2018-07-19
Payer: COMMERCIAL

## 2018-07-19 DIAGNOSIS — H35.3231: ICD-10-CM

## 2018-07-19 DIAGNOSIS — H35.051: ICD-10-CM

## 2018-07-19 DIAGNOSIS — H35.3211: ICD-10-CM

## 2018-07-19 DIAGNOSIS — H35.053: ICD-10-CM

## 2018-07-19 PROCEDURE — 92235 FLUORESCEIN ANGRPH MLTIFRAME: CPT | Performed by: OPHTHALMOLOGY

## 2018-07-19 PROCEDURE — 92250 FUNDUS PHOTOGRAPHY W/I&R: CPT | Performed by: OPHTHALMOLOGY

## 2018-07-19 PROCEDURE — 67028 INJECTION EYE DRUG: CPT | Performed by: OPHTHALMOLOGY

## 2018-07-19 ASSESSMENT — REFRACTION_OUTSIDERX
OS_VA1: 20/400
OS_VA2: 20/400
OD_VA2: 20/60
OD_SPHERE: +0.75
OD_AXIS: 020
OD_VA1: 20/60
OU_VA: 20/
OD_ADD: +3.00
OD_CYLINDER: -0.75
OD_VA3: 20/
OS_ADD: +3.00
OS_VA3: 20/
OS_SPHERE: BALANCE

## 2018-07-19 ASSESSMENT — REFRACTION_MANIFEST
OD_VA2: 20/
OD_VA2: 20/
OS_VA1: 20/
OS_VA3: 20/
OS_VA1: 20/
OD_VA1: 20/
OS_VA2: 20/
OS_VA2: 20/
OD_VA1: 20/
OD_VA3: 20/
OS_VA3: 20/
OD_VA3: 20/
OU_VA: 20/
OU_VA: 20/

## 2018-07-19 ASSESSMENT — REFRACTION_CURRENTRX
OD_ADD: +3.00
OS_CYLINDER: 0.00
OS_OVR_VA: 20/
OS_VPRISM_DIRECTION: BF
OD_OVR_VA: 20/
OS_OVR_VA: 20/
OD_OVR_VA: 20/
OD_SPHERE: +0.50
OD_AXIS: 023
OS_ADD: +3.00
OS_SPHERE: PLANO
OS_OVR_VA: 20/
OD_VPRISM_DIRECTION: BF
OS_AXIS: 180
OD_CYLINDER: -0.50
OD_OVR_VA: 20/

## 2018-07-19 ASSESSMENT — VISUAL ACUITY
OD_BCVA: 20/400
OS_BCVA: 20/60-1

## 2018-07-19 ASSESSMENT — CONFRONTATIONAL VISUAL FIELD TEST (CVF)
OD_FINDINGS: FULL
OS_FINDINGS: FULL

## 2018-07-19 ASSESSMENT — REFRACTION_AUTOREFRACTION
OS_AXIS: 104
OS_CYLINDER: -0.75
OD_SPHERE: +1.00
OD_AXIS: 20
OD_CYLINDER: -1.00
OS_SPHERE: -0.50

## 2018-07-19 ASSESSMENT — SPHEQUIV_DERIVED
OS_SPHEQUIV: -0.875
OD_SPHEQUIV: 0.5

## 2018-10-05 ENCOUNTER — DOCTOR'S OFFICE (OUTPATIENT)
Dept: URBAN - NONMETROPOLITAN AREA CLINIC 1 | Facility: CLINIC | Age: 83
Setting detail: OPHTHALMOLOGY
End: 2018-10-05
Payer: COMMERCIAL

## 2018-10-05 DIAGNOSIS — H35.3231: ICD-10-CM

## 2018-10-05 DIAGNOSIS — H43.812: ICD-10-CM

## 2018-10-05 DIAGNOSIS — H43.813: ICD-10-CM

## 2018-10-05 DIAGNOSIS — H43.811: ICD-10-CM

## 2018-10-05 DIAGNOSIS — H35.053: ICD-10-CM

## 2018-10-05 PROCEDURE — 92226 OPHTHALMOSCOPY EXT SUBSEQUENT: CPT | Performed by: OPHTHALMOLOGY

## 2018-10-05 PROCEDURE — 92134 CPTRZ OPH DX IMG PST SGM RTA: CPT | Performed by: OPHTHALMOLOGY

## 2018-10-05 PROCEDURE — 92014 COMPRE OPH EXAM EST PT 1/>: CPT | Performed by: OPHTHALMOLOGY

## 2018-10-05 ASSESSMENT — REFRACTION_MANIFEST
OD_ADD: +3.00
OS_VA3: 20/
OD_VA1: 20/60
OS_VA1: 20/
OU_VA: 20/
OD_VA3: 20/
OD_VA3: 20/
OU_VA: 20/
OS_VA1: 20/400
OD_SPHERE: +0.75
OD_CYLINDER: -0.75
OD_AXIS: 020
OS_VA2: 20/400
OS_ADD: +3.00
OD_VA2: 20/
OS_VA3: 20/
OS_VA2: 20/
OD_VA1: 20/
OD_VA2: 20/60
OS_SPHERE: BALANCE

## 2018-10-05 ASSESSMENT — SUPERFICIAL PUNCTATE KERATITIS (SPK)
OS_SPK: T
OD_SPK: T

## 2018-10-05 ASSESSMENT — REFRACTION_AUTOREFRACTION
OS_SPHERE: -0.50
OD_SPHERE: +1.00
OD_CYLINDER: -1.00
OS_CYLINDER: -0.75
OS_AXIS: 104
OD_AXIS: 20

## 2018-10-05 ASSESSMENT — REFRACTION_CURRENTRX
OS_OVR_VA: 20/
OS_SPHERE: PLANO
OS_ADD: +3.00
OD_OVR_VA: 20/
OD_AXIS: 023
OS_OVR_VA: 20/
OD_SPHERE: +0.50
OS_CYLINDER: 0.00
OD_OVR_VA: 20/
OD_ADD: +3.00
OD_OVR_VA: 20/
OS_OVR_VA: 20/
OD_VPRISM_DIRECTION: BF
OD_CYLINDER: -0.50
OS_VPRISM_DIRECTION: BF
OS_AXIS: 180

## 2018-10-05 ASSESSMENT — SPHEQUIV_DERIVED
OD_SPHEQUIV: 0.5
OS_SPHEQUIV: -0.875
OD_SPHEQUIV: 0.375

## 2018-10-05 ASSESSMENT — CONFRONTATIONAL VISUAL FIELD TEST (CVF)
OS_FINDINGS: FULL
OD_FINDINGS: FULL

## 2018-10-05 ASSESSMENT — LID POSITION - PTOSIS: OS_PTOSIS: +1

## 2018-10-05 ASSESSMENT — VISUAL ACUITY
OD_BCVA: 20/400
OS_BCVA: 20/60-2

## 2018-12-06 ENCOUNTER — DOCTOR'S OFFICE (OUTPATIENT)
Dept: URBAN - NONMETROPOLITAN AREA CLINIC 1 | Facility: CLINIC | Age: 83
Setting detail: OPHTHALMOLOGY
End: 2018-12-06
Payer: COMMERCIAL

## 2018-12-06 DIAGNOSIS — H35.3231: ICD-10-CM

## 2018-12-06 DIAGNOSIS — H43.812: ICD-10-CM

## 2018-12-06 DIAGNOSIS — H35.053: ICD-10-CM

## 2018-12-06 DIAGNOSIS — H43.813: ICD-10-CM

## 2018-12-06 DIAGNOSIS — H43.811: ICD-10-CM

## 2018-12-06 PROCEDURE — 92226 OPHTHALMOSCOPY EXT SUBSEQUENT: CPT | Performed by: OPHTHALMOLOGY

## 2018-12-06 PROCEDURE — 92014 COMPRE OPH EXAM EST PT 1/>: CPT | Performed by: OPHTHALMOLOGY

## 2018-12-06 PROCEDURE — 92134 CPTRZ OPH DX IMG PST SGM RTA: CPT | Performed by: OPHTHALMOLOGY

## 2018-12-06 ASSESSMENT — REFRACTION_MANIFEST
OS_ADD: +3.00
OS_VA2: 20/400
OU_VA: 20/
OD_VA2: 20/60
OD_SPHERE: +0.75
OS_VA2: 20/
OD_ADD: +3.00
OD_VA1: 20/
OS_VA3: 20/
OD_VA3: 20/
OU_VA: 20/
OS_VA1: 20/
OD_AXIS: 020
OD_VA1: 20/60
OS_SPHERE: BALANCE
OS_VA3: 20/
OD_VA2: 20/
OD_CYLINDER: -0.75
OD_VA3: 20/
OS_VA1: 20/400

## 2018-12-06 ASSESSMENT — REFRACTION_CURRENTRX
OD_VPRISM_DIRECTION: BF
OS_SPHERE: PLANO
OD_AXIS: 023
OD_SPHERE: +0.50
OD_OVR_VA: 20/
OS_OVR_VA: 20/
OS_CYLINDER: 0.00
OS_OVR_VA: 20/
OD_OVR_VA: 20/
OD_CYLINDER: -0.50
OD_OVR_VA: 20/
OD_ADD: +3.00
OS_VPRISM_DIRECTION: BF
OS_OVR_VA: 20/
OS_ADD: +3.00
OS_AXIS: 180

## 2018-12-06 ASSESSMENT — REFRACTION_AUTOREFRACTION
OD_AXIS: 20
OD_SPHERE: +1.00
OS_SPHERE: -0.50
OD_CYLINDER: -1.00
OS_AXIS: 104
OS_CYLINDER: -0.75

## 2018-12-06 ASSESSMENT — VISUAL ACUITY
OS_BCVA: 20/60+1
OD_BCVA: 20/400

## 2018-12-06 ASSESSMENT — SPHEQUIV_DERIVED
OD_SPHEQUIV: 0.375
OD_SPHEQUIV: 0.5
OS_SPHEQUIV: -0.875

## 2018-12-06 ASSESSMENT — CONFRONTATIONAL VISUAL FIELD TEST (CVF)
OS_FINDINGS: FULL
OD_FINDINGS: FULL

## 2018-12-06 ASSESSMENT — SUPERFICIAL PUNCTATE KERATITIS (SPK)
OD_SPK: T
OS_SPK: T

## 2018-12-06 ASSESSMENT — LID POSITION - PTOSIS: OS_PTOSIS: +1

## 2018-12-17 ENCOUNTER — DOCTOR'S OFFICE (OUTPATIENT)
Dept: URBAN - NONMETROPOLITAN AREA CLINIC 1 | Facility: CLINIC | Age: 83
Setting detail: OPHTHALMOLOGY
End: 2018-12-17
Payer: COMMERCIAL

## 2018-12-17 DIAGNOSIS — H35.3221: ICD-10-CM

## 2018-12-17 PROCEDURE — 67028 INJECTION EYE DRUG: CPT | Performed by: OPHTHALMOLOGY

## 2018-12-17 PROCEDURE — 92235 FLUORESCEIN ANGRPH MLTIFRAME: CPT | Performed by: OPHTHALMOLOGY

## 2018-12-17 PROCEDURE — 92250 FUNDUS PHOTOGRAPHY W/I&R: CPT | Performed by: OPHTHALMOLOGY

## 2018-12-17 ASSESSMENT — REFRACTION_MANIFEST
OD_VA1: 20/
OD_VA3: 20/
OS_VA1: 20/400
OD_VA2: 20/60
OD_AXIS: 020
OU_VA: 20/
OU_VA: 20/
OD_VA2: 20/
OS_ADD: +3.00
OS_VA3: 20/
OD_VA1: 20/60
OS_SPHERE: BALANCE
OS_VA1: 20/
OD_SPHERE: +0.75
OD_CYLINDER: -0.75
OS_VA3: 20/
OD_ADD: +3.00
OD_VA3: 20/
OS_VA2: 20/400
OS_VA2: 20/

## 2018-12-17 ASSESSMENT — REFRACTION_CURRENTRX
OD_OVR_VA: 20/
OD_VPRISM_DIRECTION: BF
OS_CYLINDER: 0.00
OD_OVR_VA: 20/
OD_AXIS: 023
OS_OVR_VA: 20/
OS_ADD: +3.00
OS_AXIS: 180
OS_SPHERE: PLANO
OD_SPHERE: +0.50
OD_CYLINDER: -0.50
OS_OVR_VA: 20/
OS_OVR_VA: 20/
OS_VPRISM_DIRECTION: BF
OD_OVR_VA: 20/
OD_ADD: +3.00

## 2018-12-17 ASSESSMENT — REFRACTION_AUTOREFRACTION
OS_CYLINDER: -0.75
OS_SPHERE: -0.50
OD_SPHERE: +1.00
OD_CYLINDER: -1.00
OS_AXIS: 104
OD_AXIS: 20

## 2018-12-17 ASSESSMENT — SPHEQUIV_DERIVED
OD_SPHEQUIV: 0.375
OS_SPHEQUIV: -0.875
OD_SPHEQUIV: 0.5

## 2018-12-17 ASSESSMENT — VISUAL ACUITY
OS_BCVA: 20/60+1
OD_BCVA: 20/400

## 2019-01-17 ENCOUNTER — DOCTOR'S OFFICE (OUTPATIENT)
Dept: URBAN - NONMETROPOLITAN AREA CLINIC 1 | Facility: CLINIC | Age: 84
Setting detail: OPHTHALMOLOGY
End: 2019-01-17
Payer: COMMERCIAL

## 2019-01-17 DIAGNOSIS — H43.813: ICD-10-CM

## 2019-01-17 DIAGNOSIS — H35.3231: ICD-10-CM

## 2019-01-17 DIAGNOSIS — H35.053: ICD-10-CM

## 2019-01-17 DIAGNOSIS — H04.123: ICD-10-CM

## 2019-01-17 PROCEDURE — 92014 COMPRE OPH EXAM EST PT 1/>: CPT | Performed by: OPHTHALMOLOGY

## 2019-01-17 PROCEDURE — 92134 CPTRZ OPH DX IMG PST SGM RTA: CPT | Performed by: OPHTHALMOLOGY

## 2019-01-17 ASSESSMENT — REFRACTION_MANIFEST
OD_VA2: 20/60
OS_VA1: 20/
OD_VA3: 20/
OD_VA3: 20/
OD_SPHERE: +0.75
OD_AXIS: 020
OD_CYLINDER: -0.75
OD_VA2: 20/
OS_VA2: 20/400
OS_SPHERE: BALANCE
OD_VA1: 20/
OU_VA: 20/
OS_VA1: 20/400
OS_VA3: 20/
OS_ADD: +3.00
OS_VA2: 20/
OS_VA3: 20/
OU_VA: 20/
OD_ADD: +3.00
OD_VA1: 20/60

## 2019-01-17 ASSESSMENT — REFRACTION_CURRENTRX
OD_SPHERE: +0.50
OD_ADD: +3.00
OD_OVR_VA: 20/
OD_OVR_VA: 20/
OS_ADD: +3.00
OS_VPRISM_DIRECTION: BF
OS_OVR_VA: 20/
OS_OVR_VA: 20/
OD_OVR_VA: 20/
OS_CYLINDER: 0.00
OD_CYLINDER: -0.50
OS_OVR_VA: 20/
OS_SPHERE: PLANO
OD_AXIS: 023
OS_AXIS: 180
OD_VPRISM_DIRECTION: BF

## 2019-01-17 ASSESSMENT — SUPERFICIAL PUNCTATE KERATITIS (SPK)
OD_SPK: T
OS_SPK: T

## 2019-01-17 ASSESSMENT — SPHEQUIV_DERIVED
OD_SPHEQUIV: 0.5
OS_SPHEQUIV: -0.875
OD_SPHEQUIV: 0.375

## 2019-01-17 ASSESSMENT — REFRACTION_AUTOREFRACTION
OS_AXIS: 104
OD_SPHERE: +1.00
OS_CYLINDER: -0.75
OD_CYLINDER: -1.00
OD_AXIS: 20
OS_SPHERE: -0.50

## 2019-01-17 ASSESSMENT — VISUAL ACUITY
OS_BCVA: 20/50-2
OD_BCVA: 20/400

## 2019-01-17 ASSESSMENT — LID POSITION - PTOSIS: OS_PTOSIS: +1

## 2019-01-17 ASSESSMENT — CONFRONTATIONAL VISUAL FIELD TEST (CVF)
OS_FINDINGS: FULL
OD_FINDINGS: FULL

## 2019-01-24 ENCOUNTER — DOCTOR'S OFFICE (OUTPATIENT)
Dept: URBAN - NONMETROPOLITAN AREA CLINIC 1 | Facility: CLINIC | Age: 84
Setting detail: OPHTHALMOLOGY
End: 2019-01-24
Payer: COMMERCIAL

## 2019-01-24 DIAGNOSIS — H35.3221: ICD-10-CM

## 2019-01-24 DIAGNOSIS — H35.053: ICD-10-CM

## 2019-01-24 PROCEDURE — 67028 INJECTION EYE DRUG: CPT | Performed by: PHYSICIAN ASSISTANT

## 2019-01-25 ASSESSMENT — SPHEQUIV_DERIVED
OD_SPHEQUIV: 0.375
OD_SPHEQUIV: 0.5
OS_SPHEQUIV: -0.875

## 2019-01-25 ASSESSMENT — REFRACTION_CURRENTRX
OS_OVR_VA: 20/
OD_SPHERE: +0.50
OD_OVR_VA: 20/
OS_AXIS: 180
OD_AXIS: 023
OS_CYLINDER: 0.00
OS_ADD: +3.00
OD_CYLINDER: -0.50
OD_ADD: +3.00
OD_VPRISM_DIRECTION: BF
OD_OVR_VA: 20/
OS_OVR_VA: 20/
OD_OVR_VA: 20/
OS_SPHERE: PLANO
OS_OVR_VA: 20/
OS_VPRISM_DIRECTION: BF

## 2019-01-25 ASSESSMENT — REFRACTION_MANIFEST
OS_SPHERE: BALANCE
OS_VA3: 20/
OD_VA2: 20/
OD_VA3: 20/
OD_VA2: 20/60
OS_VA1: 20/400
OS_ADD: +3.00
OD_SPHERE: +0.75
OD_VA3: 20/
OD_VA1: 20/
OS_VA2: 20/400
OU_VA: 20/
OS_VA1: 20/
OD_AXIS: 020
OS_VA2: 20/
OD_VA1: 20/60
OD_CYLINDER: -0.75
OU_VA: 20/
OD_ADD: +3.00
OS_VA3: 20/

## 2019-01-25 ASSESSMENT — REFRACTION_AUTOREFRACTION
OS_CYLINDER: -0.75
OS_SPHERE: -0.50
OS_AXIS: 104
OD_AXIS: 20
OD_CYLINDER: -1.00
OD_SPHERE: +1.00

## 2019-01-25 ASSESSMENT — VISUAL ACUITY
OS_BCVA: 20/50-2
OD_BCVA: 20/400

## 2019-02-11 ENCOUNTER — DOCTOR'S OFFICE (OUTPATIENT)
Dept: URBAN - NONMETROPOLITAN AREA CLINIC 1 | Facility: CLINIC | Age: 84
Setting detail: OPHTHALMOLOGY
End: 2019-02-11
Payer: COMMERCIAL

## 2019-02-11 DIAGNOSIS — H43.812: ICD-10-CM

## 2019-02-11 DIAGNOSIS — H43.811: ICD-10-CM

## 2019-02-11 DIAGNOSIS — H04.123: ICD-10-CM

## 2019-02-11 DIAGNOSIS — H43.813: ICD-10-CM

## 2019-02-11 DIAGNOSIS — H35.3231: ICD-10-CM

## 2019-02-11 DIAGNOSIS — H35.053: ICD-10-CM

## 2019-02-11 PROCEDURE — 92226 OPHTHALMOSCOPY EXT SUBSEQUENT: CPT | Performed by: OPHTHALMOLOGY

## 2019-02-11 PROCEDURE — 92014 COMPRE OPH EXAM EST PT 1/>: CPT | Performed by: OPHTHALMOLOGY

## 2019-02-11 PROCEDURE — 92235 FLUORESCEIN ANGRPH MLTIFRAME: CPT | Performed by: OPHTHALMOLOGY

## 2019-02-11 PROCEDURE — 92250 FUNDUS PHOTOGRAPHY W/I&R: CPT | Performed by: OPHTHALMOLOGY

## 2019-02-11 ASSESSMENT — CONFRONTATIONAL VISUAL FIELD TEST (CVF)
OS_FINDINGS: FULL
OD_FINDINGS: FULL

## 2019-02-11 ASSESSMENT — SUPERFICIAL PUNCTATE KERATITIS (SPK)
OD_SPK: T
OS_SPK: T

## 2019-02-11 ASSESSMENT — LID POSITION - PTOSIS: OS_PTOSIS: +1

## 2019-02-12 ASSESSMENT — REFRACTION_CURRENTRX
OD_OVR_VA: 20/
OS_AXIS: 180
OS_OVR_VA: 20/
OS_OVR_VA: 20/
OS_CYLINDER: 0.00
OD_OVR_VA: 20/
OD_ADD: +3.00
OD_CYLINDER: -0.50
OS_ADD: +3.00
OS_VPRISM_DIRECTION: BF
OD_SPHERE: +0.50
OD_AXIS: 023
OD_VPRISM_DIRECTION: BF
OS_SPHERE: PLANO
OD_OVR_VA: 20/
OS_OVR_VA: 20/

## 2019-02-12 ASSESSMENT — REFRACTION_MANIFEST
OS_VA1: 20/400
OD_CYLINDER: -0.75
OU_VA: 20/
OU_VA: 20/
OS_SPHERE: BALANCE
OD_VA3: 20/
OD_AXIS: 020
OS_VA1: 20/
OS_VA3: 20/
OD_VA2: 20/60
OD_VA2: 20/
OD_VA3: 20/
OD_SPHERE: +0.75
OS_VA2: 20/400
OD_VA1: 20/60
OS_VA3: 20/
OS_VA2: 20/
OD_ADD: +3.00
OS_ADD: +3.00
OD_VA1: 20/

## 2019-02-12 ASSESSMENT — SPHEQUIV_DERIVED
OD_SPHEQUIV: 0.375
OS_SPHEQUIV: -0.875
OD_SPHEQUIV: 0.5

## 2019-02-12 ASSESSMENT — REFRACTION_AUTOREFRACTION
OD_CYLINDER: -1.00
OD_SPHERE: +1.00
OS_CYLINDER: -0.75
OS_SPHERE: -0.50
OS_AXIS: 104
OD_AXIS: 20

## 2019-02-12 ASSESSMENT — VISUAL ACUITY
OS_BCVA: 20/50-1
OD_BCVA: 20/400

## 2019-02-26 ENCOUNTER — DOCTOR'S OFFICE (OUTPATIENT)
Dept: URBAN - NONMETROPOLITAN AREA CLINIC 1 | Facility: CLINIC | Age: 84
Setting detail: OPHTHALMOLOGY
End: 2019-02-26
Payer: COMMERCIAL

## 2019-02-26 DIAGNOSIS — H35.3221: ICD-10-CM

## 2019-02-26 DIAGNOSIS — H35.053: ICD-10-CM

## 2019-02-26 PROCEDURE — 67028 INJECTION EYE DRUG: CPT | Performed by: PHYSICIAN ASSISTANT

## 2019-02-27 ENCOUNTER — RX ONLY (RX ONLY)
Age: 84
End: 2019-02-27

## 2019-02-27 ASSESSMENT — REFRACTION_CURRENTRX
OS_OVR_VA: 20/
OD_SPHERE: +0.50
OD_CYLINDER: -0.50
OS_AXIS: 180
OS_VPRISM_DIRECTION: BF
OD_AXIS: 023
OD_VPRISM_DIRECTION: BF
OS_OVR_VA: 20/
OD_OVR_VA: 20/
OD_ADD: +3.00
OD_OVR_VA: 20/
OD_OVR_VA: 20/
OS_ADD: +3.00
OS_CYLINDER: 0.00
OS_SPHERE: PLANO
OS_OVR_VA: 20/

## 2019-02-27 ASSESSMENT — REFRACTION_MANIFEST
OS_VA3: 20/
OS_VA3: 20/
OD_VA3: 20/
OD_VA1: 20/60
OD_AXIS: 020
OS_VA1: 20/400
OD_VA2: 20/
OD_ADD: +3.00
OU_VA: 20/
OD_VA1: 20/
OU_VA: 20/
OD_CYLINDER: -0.75
OS_SPHERE: BALANCE
OS_VA2: 20/
OS_VA2: 20/400
OD_VA2: 20/60
OS_VA1: 20/
OD_VA3: 20/
OD_SPHERE: +0.75
OS_ADD: +3.00

## 2019-02-27 ASSESSMENT — REFRACTION_AUTOREFRACTION
OS_AXIS: 104
OD_SPHERE: +1.00
OS_SPHERE: -0.50
OD_AXIS: 20
OS_CYLINDER: -0.75
OD_CYLINDER: -1.00

## 2019-02-27 ASSESSMENT — SPHEQUIV_DERIVED
OS_SPHEQUIV: -0.875
OD_SPHEQUIV: 0.375
OD_SPHEQUIV: 0.5

## 2019-02-27 ASSESSMENT — VISUAL ACUITY
OS_BCVA: 20/50-1
OD_BCVA: 20/400

## 2019-03-14 ENCOUNTER — DOCTOR'S OFFICE (OUTPATIENT)
Dept: URBAN - NONMETROPOLITAN AREA CLINIC 1 | Facility: CLINIC | Age: 84
Setting detail: OPHTHALMOLOGY
End: 2019-03-14
Payer: COMMERCIAL

## 2019-03-14 DIAGNOSIS — H43.813: ICD-10-CM

## 2019-03-14 DIAGNOSIS — H04.123: ICD-10-CM

## 2019-03-14 DIAGNOSIS — H35.053: ICD-10-CM

## 2019-03-14 DIAGNOSIS — H35.3231: ICD-10-CM

## 2019-03-14 PROCEDURE — 92014 COMPRE OPH EXAM EST PT 1/>: CPT | Performed by: OPHTHALMOLOGY

## 2019-03-14 PROCEDURE — 92134 CPTRZ OPH DX IMG PST SGM RTA: CPT | Performed by: OPHTHALMOLOGY

## 2019-03-14 ASSESSMENT — REFRACTION_MANIFEST
OS_VA3: 20/
OS_SPHERE: BALANCE
OD_VA2: 20/60
OS_VA1: 20/
OD_ADD: +3.00
OS_VA1: 20/400
OD_CYLINDER: -0.75
OU_VA: 20/
OS_VA2: 20/
OS_ADD: +3.00
OD_AXIS: 020
OD_VA1: 20/
OS_VA2: 20/400
OU_VA: 20/
OD_VA2: 20/
OD_VA3: 20/
OS_VA3: 20/
OD_VA3: 20/
OD_VA1: 20/60
OD_SPHERE: +0.75

## 2019-03-14 ASSESSMENT — REFRACTION_AUTOREFRACTION
OD_CYLINDER: -1.00
OD_SPHERE: +1.00
OD_AXIS: 20
OS_CYLINDER: -0.75
OS_AXIS: 104
OS_SPHERE: -0.50

## 2019-03-14 ASSESSMENT — REFRACTION_CURRENTRX
OD_OVR_VA: 20/
OD_SPHERE: +0.50
OS_OVR_VA: 20/
OD_OVR_VA: 20/
OS_OVR_VA: 20/
OD_AXIS: 023
OS_VPRISM_DIRECTION: BF
OD_OVR_VA: 20/
OS_CYLINDER: 0.00
OS_AXIS: 180
OS_SPHERE: PLANO
OS_ADD: +3.00
OD_CYLINDER: -0.50
OD_ADD: +3.00
OS_OVR_VA: 20/
OD_VPRISM_DIRECTION: BF

## 2019-03-14 ASSESSMENT — SUPERFICIAL PUNCTATE KERATITIS (SPK)
OS_SPK: T
OD_SPK: T

## 2019-03-14 ASSESSMENT — VISUAL ACUITY
OD_BCVA: 20/150
OS_BCVA: 20/50-1

## 2019-03-14 ASSESSMENT — SPHEQUIV_DERIVED
OD_SPHEQUIV: 0.375
OS_SPHEQUIV: -0.875
OD_SPHEQUIV: 0.5

## 2019-03-14 ASSESSMENT — CONFRONTATIONAL VISUAL FIELD TEST (CVF)
OS_FINDINGS: FULL
OD_FINDINGS: FULL

## 2019-03-14 ASSESSMENT — LID POSITION - PTOSIS: OS_PTOSIS: +1

## 2019-03-28 ENCOUNTER — RX ONLY (RX ONLY)
Age: 84
End: 2019-03-28

## 2019-03-28 ENCOUNTER — DOCTOR'S OFFICE (OUTPATIENT)
Dept: URBAN - NONMETROPOLITAN AREA CLINIC 1 | Facility: CLINIC | Age: 84
Setting detail: OPHTHALMOLOGY
End: 2019-03-28
Payer: COMMERCIAL

## 2019-03-28 DIAGNOSIS — H43.813: ICD-10-CM

## 2019-03-28 DIAGNOSIS — H35.053: ICD-10-CM

## 2019-03-28 DIAGNOSIS — H35.3231: ICD-10-CM

## 2019-03-28 DIAGNOSIS — H35.3221: ICD-10-CM

## 2019-03-28 PROBLEM — H04.122 DRY EYE; RIGHT EYE, LEFT EYE: Status: ACTIVE | Noted: 2017-04-10

## 2019-03-28 PROBLEM — H53.123 SUBJECTIVE VISUAL DISTURBANCE; BOTH EYES: Status: ACTIVE | Noted: 2017-04-10

## 2019-03-28 PROBLEM — Z96.1 PSEUDOPHAKIA ; BOTH EYES: Status: ACTIVE | Noted: 2018-06-25

## 2019-03-28 PROBLEM — H04.121 DRY EYE; RIGHT EYE, LEFT EYE: Status: ACTIVE | Noted: 2017-04-10

## 2019-03-28 PROCEDURE — 92240 ICG ANGIOGRAPHY I&R UNI/BI: CPT | Performed by: OPHTHALMOLOGY

## 2019-03-28 PROCEDURE — 67028 INJECTION EYE DRUG: CPT | Performed by: OPHTHALMOLOGY

## 2019-03-28 ASSESSMENT — REFRACTION_MANIFEST
OD_SPHERE: +0.75
OD_AXIS: 020
OD_VA3: 20/
OS_ADD: +3.00
OD_VA3: 20/
OS_VA2: 20/400
OD_VA1: 20/
OU_VA: 20/
OD_ADD: +3.00
OS_VA3: 20/
OD_VA1: 20/60
OS_VA1: 20/
OS_VA2: 20/
OS_VA1: 20/400
OS_VA3: 20/
OD_CYLINDER: -0.75
OU_VA: 20/
OD_VA2: 20/60
OD_VA2: 20/
OS_SPHERE: BALANCE

## 2019-03-28 ASSESSMENT — REFRACTION_CURRENTRX
OS_CYLINDER: 0.00
OS_SPHERE: PLANO
OD_OVR_VA: 20/
OS_OVR_VA: 20/
OS_VPRISM_DIRECTION: BF
OD_ADD: +3.00
OS_OVR_VA: 20/
OD_OVR_VA: 20/
OD_VPRISM_DIRECTION: BF
OS_AXIS: 180
OD_SPHERE: +0.50
OD_OVR_VA: 20/
OD_CYLINDER: -0.50
OS_ADD: +3.00
OD_AXIS: 023
OS_OVR_VA: 20/

## 2019-03-28 ASSESSMENT — SPHEQUIV_DERIVED
OD_SPHEQUIV: 0.375
OD_SPHEQUIV: 0.5
OS_SPHEQUIV: -0.875

## 2019-03-28 ASSESSMENT — REFRACTION_AUTOREFRACTION
OD_SPHERE: +1.00
OS_CYLINDER: -0.75
OS_AXIS: 104
OS_SPHERE: -0.50
OD_AXIS: 20
OD_CYLINDER: -1.00

## 2019-03-28 ASSESSMENT — VISUAL ACUITY
OD_BCVA: 20/150
OS_BCVA: 20/50-1

## 2021-06-19 ENCOUNTER — APPOINTMENT (EMERGENCY)
Dept: RADIOLOGY | Facility: HOSPITAL | Age: 86
End: 2021-06-19
Payer: MEDICARE

## 2021-06-19 ENCOUNTER — HOSPITAL ENCOUNTER (EMERGENCY)
Facility: HOSPITAL | Age: 86
Discharge: HOME | End: 2021-06-20
Attending: EMERGENCY MEDICINE
Payer: MEDICARE

## 2021-06-19 DIAGNOSIS — E87.1 HYPONATREMIA: ICD-10-CM

## 2021-06-19 DIAGNOSIS — F41.9 ANXIETY: Primary | ICD-10-CM

## 2021-06-19 LAB
BASOPHILS # BLD AUTO: 0.06 THOUSANDS/ΜL (ref 0–0.1)
BASOPHILS NFR BLD AUTO: 1 % (ref 0–1)
EOSINOPHIL # BLD AUTO: 0.28 THOUSAND/ΜL (ref 0–0.61)
EOSINOPHIL NFR BLD AUTO: 4 % (ref 0–6)
ERYTHROCYTE [DISTWIDTH] IN BLOOD BY AUTOMATED COUNT: 14.3 % (ref 11.6–15.1)
HCT VFR BLD AUTO: 47.4 % (ref 34.8–46.1)
HGB BLD-MCNC: 15.7 G/DL (ref 11.5–15.4)
IMM GRANULOCYTES # BLD AUTO: 0.03 THOUSAND/UL (ref 0–0.2)
IMM GRANULOCYTES NFR BLD AUTO: 0 % (ref 0–2)
LYMPHOCYTES # BLD AUTO: 3.67 THOUSANDS/ΜL (ref 0.6–4.47)
LYMPHOCYTES NFR BLD AUTO: 49 % (ref 14–44)
MCH RBC QN AUTO: 29.8 PG (ref 26.8–34.3)
MCHC RBC AUTO-ENTMCNC: 33.1 G/DL (ref 31.4–37.4)
MCV RBC AUTO: 90 FL (ref 82–98)
MONOCYTES # BLD AUTO: 0.86 THOUSAND/ΜL (ref 0.17–1.22)
MONOCYTES NFR BLD AUTO: 12 % (ref 4–12)
NEUTROPHILS # BLD AUTO: 2.56 THOUSANDS/ΜL (ref 1.85–7.62)
NEUTS SEG NFR BLD AUTO: 34 % (ref 43–75)
NRBC BLD AUTO-RTO: 0 /100 WBCS
PLATELET # BLD AUTO: 150 THOUSANDS/UL (ref 149–390)
PMV BLD AUTO: 10.4 FL (ref 8.9–12.7)
RBC # BLD AUTO: 5.27 MILLION/UL (ref 3.81–5.12)
WBC # BLD AUTO: 7.46 THOUSAND/UL (ref 4.31–10.16)

## 2021-06-19 PROCEDURE — 80053 COMPREHEN METABOLIC PANEL: CPT | Performed by: EMERGENCY MEDICINE

## 2021-06-19 PROCEDURE — 36415 COLL VENOUS BLD VENIPUNCTURE: CPT | Performed by: EMERGENCY MEDICINE

## 2021-06-19 PROCEDURE — 84484 ASSAY OF TROPONIN QUANT: CPT | Performed by: EMERGENCY MEDICINE

## 2021-06-19 PROCEDURE — 85730 THROMBOPLASTIN TIME PARTIAL: CPT | Performed by: EMERGENCY MEDICINE

## 2021-06-19 PROCEDURE — 85025 COMPLETE CBC W/AUTO DIFF WBC: CPT | Performed by: EMERGENCY MEDICINE

## 2021-06-19 PROCEDURE — 83880 ASSAY OF NATRIURETIC PEPTIDE: CPT | Performed by: EMERGENCY MEDICINE

## 2021-06-19 PROCEDURE — 93005 ELECTROCARDIOGRAM TRACING: CPT

## 2021-06-19 PROCEDURE — 71045 X-RAY EXAM CHEST 1 VIEW: CPT

## 2021-06-19 PROCEDURE — 99285 EMERGENCY DEPT VISIT HI MDM: CPT | Performed by: EMERGENCY MEDICINE

## 2021-06-19 PROCEDURE — 85610 PROTHROMBIN TIME: CPT | Performed by: EMERGENCY MEDICINE

## 2021-06-19 PROCEDURE — 99284 EMERGENCY DEPT VISIT MOD MDM: CPT

## 2021-06-19 RX ORDER — AMLODIPINE BESYLATE 5 MG/1
5 TABLET ORAL DAILY
COMMUNITY

## 2021-06-19 RX ORDER — ATORVASTATIN CALCIUM 20 MG/1
20 TABLET, FILM COATED ORAL DAILY
COMMUNITY

## 2021-06-19 RX ORDER — METOPROLOL SUCCINATE 100 MG/1
100 TABLET, EXTENDED RELEASE ORAL DAILY
COMMUNITY

## 2021-06-19 RX ORDER — DIGOXIN 125 MCG
125 TABLET ORAL DAILY
COMMUNITY

## 2021-06-20 VITALS
SYSTOLIC BLOOD PRESSURE: 164 MMHG | HEART RATE: 73 BPM | DIASTOLIC BLOOD PRESSURE: 73 MMHG | TEMPERATURE: 97.8 F | OXYGEN SATURATION: 94 % | RESPIRATION RATE: 22 BRPM | WEIGHT: 141.54 LBS

## 2021-06-20 LAB
ALBUMIN SERPL BCP-MCNC: 3.6 G/DL (ref 3.5–5)
ALP SERPL-CCNC: 98 U/L (ref 46–116)
ALT SERPL W P-5'-P-CCNC: 15 U/L (ref 12–78)
ANION GAP SERPL CALCULATED.3IONS-SCNC: 8 MMOL/L (ref 4–13)
APTT PPP: 33 SECONDS (ref 23–37)
AST SERPL W P-5'-P-CCNC: 26 U/L (ref 5–45)
BILIRUB SERPL-MCNC: 1.05 MG/DL (ref 0.2–1)
BUN SERPL-MCNC: 15 MG/DL (ref 5–25)
CALCIUM SERPL-MCNC: 9 MG/DL (ref 8.3–10.1)
CHLORIDE SERPL-SCNC: 96 MMOL/L (ref 100–108)
CO2 SERPL-SCNC: 30 MMOL/L (ref 21–32)
CREAT SERPL-MCNC: 0.87 MG/DL (ref 0.6–1.3)
GFR SERPL CREATININE-BSD FRML MDRD: 56 ML/MIN/1.73SQ M
GLUCOSE SERPL-MCNC: 109 MG/DL (ref 65–140)
INR PPP: 1.33 (ref 0.84–1.19)
NT-PROBNP SERPL-MCNC: 562 PG/ML
POTASSIUM SERPL-SCNC: 4 MMOL/L (ref 3.5–5.3)
PROT SERPL-MCNC: 7.4 G/DL (ref 6.4–8.2)
PROTHROMBIN TIME: 16.2 SECONDS (ref 11.6–14.5)
SODIUM SERPL-SCNC: 134 MMOL/L (ref 136–145)
TROPONIN I SERPL-MCNC: <0.02 NG/ML

## 2021-06-20 NOTE — ED PROVIDER NOTES
History  Chief Complaint   Patient presents with    Anxiety     anxiety since 1800 this evening  Patient is a 54-year-old female with history of atrial fibrillation who resides at home with her son presents the emergency department due to anxiety she reports feeling very anxious since about 6:00 a m  This evening did have some shortness of breath that has now resolved no other associated symptoms patient does not want anything for anxiety currently  History provided by:  Patient  Anxiety  Presenting symptoms comment:  Anxiety  Degree of incapacity (severity): Moderate  Onset quality:  Gradual  Timing:  Intermittent  Progression:  Waxing and waning  Chronicity:  Recurrent  Associated symptoms: anxiety    Associated symptoms: no abdominal pain, no appetite change, no chest pain, no fatigue and no headaches        Prior to Admission Medications   Prescriptions Last Dose Informant Patient Reported? Taking? amLODIPine (NORVASC) 5 mg tablet 6/19/2021 at Unknown time  Yes Yes   Sig: Take 5 mg by mouth daily   apixaban (Eliquis) 2 5 mg 6/19/2021 at Unknown time  Yes Yes   Sig: Take by mouth   atorvastatin (LIPITOR) 20 mg tablet 6/19/2021 at Unknown time  Yes Yes   Sig: Take 20 mg by mouth daily   digoxin (LANOXIN) 0 125 mg tablet 6/19/2021 at Unknown time  Yes Yes   Sig: Take 125 mcg by mouth daily   metoprolol succinate (TOPROL-XL) 100 mg 24 hr tablet 6/19/2021 at Unknown time  Yes Yes   Sig: Take 100 mg by mouth daily      Facility-Administered Medications: None       History reviewed  No pertinent past medical history  History reviewed  No pertinent surgical history  History reviewed  No pertinent family history  I have reviewed and agree with the history as documented      E-Cigarette/Vaping    E-Cigarette Use Never User      E-Cigarette/Vaping Substances    Nicotine No     THC No     CBD No     Flavoring No     Other No     Unknown No      Social History     Tobacco Use    Smoking status: Never Smoker    Smokeless tobacco: Never Used   Vaping Use    Vaping Use: Never used   Substance Use Topics    Alcohol use: Not Currently    Drug use: Not Currently       Review of Systems   Constitutional: Negative for activity change, appetite change, chills, fatigue and fever  HENT: Negative for congestion, ear pain, rhinorrhea and sore throat  Eyes: Negative for discharge, redness and visual disturbance  Respiratory: Negative for cough, chest tightness, shortness of breath and wheezing  Cardiovascular: Negative for chest pain and palpitations  Gastrointestinal: Negative for abdominal pain, constipation, diarrhea, nausea and vomiting  Endocrine: Negative for polydipsia and polyuria  Genitourinary: Negative for difficulty urinating, dysuria, frequency, hematuria and urgency  Musculoskeletal: Negative for arthralgias and myalgias  Skin: Negative for color change, pallor and rash  Neurological: Negative for dizziness, weakness, light-headedness, numbness and headaches  Hematological: Negative for adenopathy  Does not bruise/bleed easily  Psychiatric/Behavioral: The patient is nervous/anxious  All other systems reviewed and are negative  Physical Exam  Physical Exam  Vitals and nursing note reviewed  Constitutional:       Appearance: She is well-developed  HENT:      Head: Normocephalic and atraumatic  Right Ear: External ear normal       Left Ear: External ear normal       Nose: Nose normal    Eyes:      Conjunctiva/sclera: Conjunctivae normal       Pupils: Pupils are equal, round, and reactive to light  Cardiovascular:      Rate and Rhythm: Normal rate and regular rhythm  Heart sounds: Normal heart sounds  Pulmonary:      Effort: Pulmonary effort is normal  No respiratory distress  Breath sounds: Normal breath sounds  No wheezing or rales  Chest:      Chest wall: No tenderness  Abdominal:      General: Bowel sounds are normal  There is no distension  Palpations: Abdomen is soft  Tenderness: There is no abdominal tenderness  There is no guarding  Musculoskeletal:         General: Normal range of motion  Cervical back: Normal range of motion and neck supple  Skin:     General: Skin is warm and dry  Neurological:      Mental Status: She is alert and oriented to person, place, and time  Cranial Nerves: No cranial nerve deficit  Sensory: No sensory deficit           Vital Signs  ED Triage Vitals   Temperature Pulse Respirations Blood Pressure SpO2   06/19/21 2332 06/19/21 2332 06/19/21 2332 06/19/21 2332 06/19/21 2332   97 8 °F (36 6 °C) 70 18 165/89 95 %      Temp Source Heart Rate Source Patient Position - Orthostatic VS BP Location FiO2 (%)   06/19/21 2332 06/20/21 0000 06/19/21 2332 06/19/21 2332 --   Temporal Monitor Lying Right arm       Pain Score       06/19/21 2332       No Pain           Vitals:    06/19/21 2332 06/20/21 0000   BP: 165/89 143/67   Pulse: 70 68   Patient Position - Orthostatic VS: Lying Lying         Visual Acuity      ED Medications  Medications - No data to display    Diagnostic Studies  Results Reviewed     Procedure Component Value Units Date/Time    Comprehensive metabolic panel [[id number]]  (Abnormal) Collected: 06/19/21 2342    Lab Status: Final result Specimen: Blood from Arm, Left Updated: 06/20/21 0013     Sodium 134 mmol/L      Potassium 4 0 mmol/L      Chloride 96 mmol/L      CO2 30 mmol/L      ANION GAP 8 mmol/L      BUN 15 mg/dL      Creatinine 0 87 mg/dL      Glucose 109 mg/dL      Calcium 9 0 mg/dL      AST 26 U/L      ALT 15 U/L      Alkaline Phosphatase 98 U/L      Total Protein 7 4 g/dL      Albumin 3 6 g/dL      Total Bilirubin 1 05 mg/dL      eGFR 56 ml/min/1 73sq m     Narrative:      [place] guidelines for Chronic Kidney Disease (CKD):     Stage 1 with normal or high GFR (GFR > 90 mL/min/1 73 square meters)    Stage 2 Mild CKD (GFR = 60-89 mL/min/1 73 square meters)    Stage 3A Moderate CKD (GFR = 45-59 mL/min/1 73 square meters)    Stage 3B Moderate CKD (GFR = 30-44 mL/min/1 73 square meters)    Stage 4 Severe CKD (GFR = 15-29 mL/min/1 73 square meters)    Stage 5 End Stage CKD (GFR <15 mL/min/1 73 square meters)  Note: GFR calculation is accurate only with a steady state creatinine    NT-BNP PRO [[id number]]  (Abnormal) Collected: 06/19/21 2342    Lab Status: Final result Specimen: Blood from Arm, Left Updated: 06/20/21 0013     NT-proBNP 562 pg/mL     Troponin I [[id number]]  (Normal) Collected: 06/19/21 2342    Lab Status: Final result Specimen: Blood from Arm, Left Updated: 06/20/21 0012     Troponin I <0 02 ng/mL     Protime-INR [[id number]]  (Abnormal) Collected: 06/19/21 2342    Lab Status: Final result Specimen: Blood from Arm, Left Updated: 06/20/21 0010     Protime 16 2 seconds      INR 1 33    APTT [[id number]]  (Normal) Collected: 06/19/21 2342    Lab Status: Final result Specimen: Blood from Arm, Left Updated: 06/20/21 0010     PTT 33 seconds     CBC and differential [[id number]]  (Abnormal) Collected: 06/19/21 2342    Lab Status: Final result Specimen: Blood from Arm, Left Updated: 06/19/21 2348     WBC 7 46 Thousand/uL      RBC 5 27 Million/uL      Hemoglobin 15 7 g/dL      Hematocrit 47 4 %      MCV 90 fL      MCH 29 8 pg      MCHC 33 1 g/dL      RDW 14 3 %      MPV 10 4 fL      Platelets 770 Thousands/uL      nRBC 0 /100 WBCs      Neutrophils Relative 34 %      Immat GRANS % 0 %      Lymphocytes Relative 49 %      Monocytes Relative 12 %      Eosinophils Relative 4 %      Basophils Relative 1 %      Neutrophils Absolute 2 56 Thousands/µL      Immature Grans Absolute 0 03 Thousand/uL      Lymphocytes Absolute 3 67 Thousands/µL      Monocytes Absolute 0 86 Thousand/µL      Eosinophils Absolute 0 28 Thousand/µL      Basophils Absolute 0 06 Thousands/µL                  XR chest 1 view portable   ED Interpretation by Janusz Kay DO (06/20 0017) Borderline cardiomegaly small left pleural effusion no acute disease                 Procedures  ECG 12 Lead Documentation Only    Date/Time: 6/19/2021 11:45 PM  Performed by: Lo Singh DO  Authorized by: Lo Singh DO     ECG reviewed by me, the ED Provider: yes    Patient location:  ED  Previous ECG:     Comparison to cardiac monitor: Yes    Quality:     Tracing quality:  Limited by artifact  Rate:     ECG rate:  74    ECG rate assessment: normal    Rhythm:     Rhythm: atrial fibrillation    QRS:     QRS axis:  Right    QRS intervals:  Normal  Conduction:     Conduction: normal    ST segments:     ST segments:  Non-specific  T waves:     T waves: non-specific               ED Course                             SBIRT 20yo+      Most Recent Value   SBIRT (24 yo +)   In order to provide better care to our patients, we are screening all of our patients for alcohol and drug use  Would it be okay to ask you these screening questions? Yes Filed at: 06/19/2021 2350   Initial Alcohol Screen: US AUDIT-C    1  How often do you have a drink containing alcohol?  0 Filed at: 06/19/2021 2350   2  How many drinks containing alcohol do you have on a typical day you are drinking? 0 Filed at: 06/19/2021 2350   3a  Male UNDER 65: How often do you have five or more drinks on one occasion? 0 Filed at: 06/19/2021 2350   3b  FEMALE Any Age, or MALE 65+: How often do you have 4 or more drinks on one occassion? 0 Filed at: 06/19/2021 2350   Audit-C Score  0 Filed at: 06/19/2021 2350   MASSIMO: How many times in the past year have you    Used an illegal drug or used a prescription medication for non-medical reasons?   Never Filed at: 06/19/2021 2350                    MDM  Number of Diagnoses or Management Options  Anxiety: new and requires workup  Hyponatremia: new and requires workup  Diagnosis management comments: Patient remained clinically and hemodynamically stable throughout observation monitoring in the ED she wanted nothing for anxiety symptoms and clinical presentation strongly consistent with acute anxiety patient denies any shortness of breath or chest pain there is no evidence of acute decompensated congestive heart failure present patient has normal O2 saturation on room air normal respiratory rate no respiratory distress or difficulty breathing at rest   Patient wanted nothing for anxiety felt well with rest and reassurance essentially unremarkable workup in the ED with mild hyponatremia noted  Advised prompt follow-up with primary physician for further evaluation treatment obtain test results return precautions and anticipatory guidance discussed  Amount and/or Complexity of Data Reviewed  Clinical lab tests: ordered and reviewed  Tests in the radiology section of CPT®: ordered and reviewed  Tests in the medicine section of CPT®: ordered and reviewed  Decide to obtain previous medical records or to obtain history from someone other than the patient: yes  Review and summarize past medical records: yes  Independent visualization of images, tracings, or specimens: yes    Risk of Complications, Morbidity, and/or Mortality  Presenting problems: moderate  Diagnostic procedures: moderate  Management options: moderate    Patient Progress  Patient progress: stable      Disposition  Final diagnoses:   Anxiety   Hyponatremia - mild     Time reflects when diagnosis was documented in both MDM as applicable and the Disposition within this note     Time User Action Codes Description Comment    6/20/2021 12:15 AM Sheba Huntley Add [F41 9] Anxiety     6/20/2021 12:15 AM Radhames Dhillon Add [E87 1] Hyponatremia     6/20/2021 12:15 AM Sheba Huntley Modify [E87 1] Hyponatremia mild      ED Disposition     ED Disposition Condition Date/Time Comment    Discharge Stable Sun Jun 20, 2021 12:15 AM Narda Conrad discharge to home/self care              Follow-up Information     Follow up With Specialties Details Why Lacho Johnson 58Allan Arlene Meng MD Internal Medicine Schedule an appointment as soon as possible for a visit in 2 days  [address]  [phone]            Patient's Medications   Discharge Prescriptions    No medications on file     No discharge procedures on file      PDMP Review     None          ED Provider  Electronically Signed by           Rohit Jenkins DO  06/20/21 0018

## 2021-06-26 LAB
ATRIAL RATE: 288 BPM
QRS AXIS: 232 DEGREES
QRSD INTERVAL: 78 MS
QT INTERVAL: 350 MS
QTC INTERVAL: 388 MS
T WAVE AXIS: -36 DEGREES
VENTRICULAR RATE: 74 BPM

## 2021-06-26 PROCEDURE — 93010 ELECTROCARDIOGRAM REPORT: CPT | Performed by: INTERNAL MEDICINE

## 2022-02-19 ENCOUNTER — HOSPITAL ENCOUNTER (EMERGENCY)
Facility: HOSPITAL | Age: 87
Discharge: HOME | End: 2022-02-19
Attending: EMERGENCY MEDICINE
Payer: MEDICARE

## 2022-02-19 ENCOUNTER — APPOINTMENT (EMERGENCY)
Dept: RADIOLOGY | Facility: HOSPITAL | Age: 87
End: 2022-02-19
Payer: MEDICARE

## 2022-02-19 VITALS
TEMPERATURE: 98.8 F | OXYGEN SATURATION: 92 % | HEART RATE: 72 BPM | BODY MASS INDEX: 24.24 KG/M2 | WEIGHT: 145.5 LBS | SYSTOLIC BLOOD PRESSURE: 136 MMHG | DIASTOLIC BLOOD PRESSURE: 67 MMHG | RESPIRATION RATE: 22 BRPM | HEIGHT: 65 IN

## 2022-02-19 DIAGNOSIS — L03.116 CELLULITIS OF LEFT LOWER EXTREMITY: Primary | ICD-10-CM

## 2022-02-19 LAB
ALBUMIN SERPL BCP-MCNC: 3.6 G/DL (ref 3.5–5)
ALP SERPL-CCNC: 92 U/L (ref 46–116)
ALT SERPL W P-5'-P-CCNC: 15 U/L (ref 12–78)
ANION GAP SERPL CALCULATED.3IONS-SCNC: 7 MMOL/L (ref 4–13)
AST SERPL W P-5'-P-CCNC: 20 U/L (ref 5–45)
BASOPHILS # BLD AUTO: 0.04 THOUSANDS/ΜL (ref 0–0.1)
BASOPHILS NFR BLD AUTO: 1 % (ref 0–1)
BILIRUB SERPL-MCNC: 1.16 MG/DL (ref 0.2–1)
BUN SERPL-MCNC: 15 MG/DL (ref 5–25)
CALCIUM SERPL-MCNC: 8.9 MG/DL (ref 8.3–10.1)
CHLORIDE SERPL-SCNC: 97 MMOL/L (ref 100–108)
CO2 SERPL-SCNC: 31 MMOL/L (ref 21–32)
CREAT SERPL-MCNC: 0.82 MG/DL (ref 0.6–1.3)
EOSINOPHIL # BLD AUTO: 0.26 THOUSAND/ΜL (ref 0–0.61)
EOSINOPHIL NFR BLD AUTO: 4 % (ref 0–6)
ERYTHROCYTE [DISTWIDTH] IN BLOOD BY AUTOMATED COUNT: 13.8 % (ref 11.6–15.1)
GFR SERPL CREATININE-BSD FRML MDRD: 60 ML/MIN/1.73SQ M
GLUCOSE SERPL-MCNC: 131 MG/DL (ref 65–140)
HCT VFR BLD AUTO: 45.8 % (ref 34.8–46.1)
HGB BLD-MCNC: 15.2 G/DL (ref 11.5–15.4)
IMM GRANULOCYTES # BLD AUTO: 0.02 THOUSAND/UL (ref 0–0.2)
IMM GRANULOCYTES NFR BLD AUTO: 0 % (ref 0–2)
LACTATE SERPL-SCNC: 1.2 MMOL/L (ref 0.5–2)
LYMPHOCYTES # BLD AUTO: 2.5 THOUSANDS/ΜL (ref 0.6–4.47)
LYMPHOCYTES NFR BLD AUTO: 40 % (ref 14–44)
MCH RBC QN AUTO: 30.8 PG (ref 26.8–34.3)
MCHC RBC AUTO-ENTMCNC: 33.2 G/DL (ref 31.4–37.4)
MCV RBC AUTO: 93 FL (ref 82–98)
MONOCYTES # BLD AUTO: 0.87 THOUSAND/ΜL (ref 0.17–1.22)
MONOCYTES NFR BLD AUTO: 14 % (ref 4–12)
NEUTROPHILS # BLD AUTO: 2.6 THOUSANDS/ΜL (ref 1.85–7.62)
NEUTS SEG NFR BLD AUTO: 41 % (ref 43–75)
NRBC BLD AUTO-RTO: 0 /100 WBCS
PLATELET # BLD AUTO: 172 THOUSANDS/UL (ref 149–390)
PMV BLD AUTO: 10.7 FL (ref 8.9–12.7)
POTASSIUM SERPL-SCNC: 4.1 MMOL/L (ref 3.5–5.3)
PROT SERPL-MCNC: 7.7 G/DL (ref 6.4–8.2)
RBC # BLD AUTO: 4.94 MILLION/UL (ref 3.81–5.12)
SODIUM SERPL-SCNC: 135 MMOL/L (ref 136–145)
WBC # BLD AUTO: 6.29 THOUSAND/UL (ref 4.31–10.16)

## 2022-02-19 PROCEDURE — 99285 EMERGENCY DEPT VISIT HI MDM: CPT | Performed by: PHYSICIAN ASSISTANT

## 2022-02-19 PROCEDURE — 85025 COMPLETE CBC W/AUTO DIFF WBC: CPT | Performed by: PHYSICIAN ASSISTANT

## 2022-02-19 PROCEDURE — 36415 COLL VENOUS BLD VENIPUNCTURE: CPT | Performed by: PHYSICIAN ASSISTANT

## 2022-02-19 PROCEDURE — 96365 THER/PROPH/DIAG IV INF INIT: CPT

## 2022-02-19 PROCEDURE — 80053 COMPREHEN METABOLIC PANEL: CPT | Performed by: PHYSICIAN ASSISTANT

## 2022-02-19 PROCEDURE — 73610 X-RAY EXAM OF ANKLE: CPT

## 2022-02-19 PROCEDURE — 99284 EMERGENCY DEPT VISIT MOD MDM: CPT

## 2022-02-19 PROCEDURE — 73590 X-RAY EXAM OF LOWER LEG: CPT

## 2022-02-19 PROCEDURE — 83605 ASSAY OF LACTIC ACID: CPT | Performed by: PHYSICIAN ASSISTANT

## 2022-02-19 RX ORDER — CEFTRIAXONE 2 G/50ML
2000 INJECTION, SOLUTION INTRAVENOUS ONCE
Status: COMPLETED | OUTPATIENT
Start: 2022-02-19 | End: 2022-02-19

## 2022-02-19 RX ORDER — CEPHALEXIN 250 MG/1
500 CAPSULE ORAL ONCE
Status: COMPLETED | OUTPATIENT
Start: 2022-02-19 | End: 2022-02-19

## 2022-02-19 RX ORDER — CEPHALEXIN 500 MG/1
500 CAPSULE ORAL EVERY 6 HOURS SCHEDULED
Qty: 28 CAPSULE | Refills: 0 | Status: SHIPPED | OUTPATIENT
Start: 2022-02-19 | End: 2022-02-26

## 2022-02-19 RX ORDER — CEPHALEXIN 500 MG/1
500 CAPSULE ORAL EVERY 6 HOURS SCHEDULED
Qty: 28 CAPSULE | Refills: 0 | Status: SHIPPED | OUTPATIENT
Start: 2022-02-19 | End: 2022-02-19 | Stop reason: SDUPTHER

## 2022-02-19 RX ORDER — TRIAMTERENE AND HYDROCHLOROTHIAZIDE 37.5; 25 MG/1; MG/1
1 CAPSULE ORAL EVERY MORNING
COMMUNITY

## 2022-02-19 RX ADMIN — CEPHALEXIN 500 MG: 250 CAPSULE ORAL at 14:55

## 2022-02-19 RX ADMIN — CEFTRIAXONE 2000 MG: 2 INJECTION, SOLUTION INTRAVENOUS at 14:30

## 2022-02-19 NOTE — ED NOTES
Patient discharged to home  Verbalized understanding of discharge instructions and need for follow up care  IV dc'd intact         Evita Jennings RN  02/19/22 2775

## 2022-02-19 NOTE — ED PROVIDER NOTES
History  Chief Complaint   Patient presents with    Leg Injury     Pt arrives reporting LLE edema since yesterday  pt has a spot on shin that is red and draining  The patient is a 51-year-old female with a past medical history of CVA on Eliquis, hypertension who presents emergency department today with a complaint of left lower leg swelling, week being and redness since yesterday escorted by her daughter-in-law  The patient states that yesterday she noticed some swelling in her left lower leg  Patient states that the swelling is worse today and she has also noticed some clear drainage  Patient has some redness around the left lower extremity  She denies any falls or traumas  Patient typically ambulates at home well without any assistance  Patient denies any fevers chills any nausea vomiting diarrhea shortness of breath or chest pain  Patient is able to move the extremity well  History provided by:  Patient and relative  Leg Pain  Location:  Leg  Time since incident:  1 day  Injury: no    Leg location:  L lower leg  Pain details:     Quality:  Throbbing    Radiates to:  Does not radiate    Severity:  Mild    Onset quality:  Unable to specify    Duration:  1 day    Timing:  Constant    Progression:  Worsening  Chronicity:  New  Dislocation: no    Foreign body present:  No foreign bodies  Tetanus status:  Unknown  Prior injury to area:  No  Relieved by:  None tried  Worsened by:  Nothing  Ineffective treatments:  None tried  Associated symptoms: swelling    Associated symptoms: no back pain, no fatigue and no fever    Swelling:     Location:  Leg    Onset quality:  Gradual    Duration:  1 day    Timing:  Constant    Progression:  Worsening    Chronicity:  New  Risk factors: no recent illness        Prior to Admission Medications   Prescriptions Last Dose Informant Patient Reported? Taking?    amLODIPine (NORVASC) 5 mg tablet 2/19/2022 at Unknown time  Yes Yes   Sig: Take 5 mg by mouth daily   apixaban (Eliquis) 2 5 mg 2/19/2022 at Unknown time  Yes Yes   Sig: Take by mouth   atorvastatin (LIPITOR) 20 mg tablet 2/18/2022 at Unknown time  Yes Yes   Sig: Take 20 mg by mouth daily   digoxin (LANOXIN) 0 125 mg tablet 2/19/2022 at Unknown time  Yes Yes   Sig: Take 125 mcg by mouth daily   metoprolol succinate (TOPROL-XL) 100 mg 24 hr tablet 2/19/2022 at Unknown time  Yes Yes   Sig: Take 100 mg by mouth daily   triamterene-hydrochlorothiazide (DYAZIDE) 37 5-25 mg per capsule Past Week at Unknown time  Yes Yes   Sig: Take 1 capsule by mouth every morning PT takes on Monday and Thursday      Facility-Administered Medications: None       History reviewed  No pertinent past medical history  History reviewed  No pertinent surgical history  History reviewed  No pertinent family history  I have reviewed and agree with the history as documented  E-Cigarette/Vaping    E-Cigarette Use Never User      E-Cigarette/Vaping Substances    Nicotine No     THC No     CBD No     Flavoring No     Other No     Unknown No      Social History     Tobacco Use    Smoking status: Never Smoker    Smokeless tobacco: Never Used   Vaping Use    Vaping Use: Never used   Substance Use Topics    Alcohol use: Not Currently    Drug use: Not Currently       Review of Systems   Constitutional: Negative for chills, fatigue and fever  HENT: Negative for ear pain and sore throat  Eyes: Negative for pain and visual disturbance  Respiratory: Negative for cough and shortness of breath  Cardiovascular: Negative for chest pain and palpitations  Gastrointestinal: Negative for abdominal pain and vomiting  Genitourinary: Negative for dysuria and hematuria  Musculoskeletal: Negative for arthralgias and back pain  Skin: Negative for color change and rash  Neurological: Negative for seizures and syncope  All other systems reviewed and are negative  Physical Exam  Physical Exam  Vitals and nursing note reviewed  Constitutional:       General: She is not in acute distress  Appearance: She is well-developed  HENT:      Head: Normocephalic and atraumatic  Eyes:      Extraocular Movements: Extraocular movements intact  Conjunctiva/sclera: Conjunctivae normal       Pupils: Pupils are equal, round, and reactive to light  Cardiovascular:      Rate and Rhythm: Normal rate and regular rhythm  Pulses:           Dorsalis pedis pulses are 2+ on the left side  Posterior tibial pulses are 2+ on the left side  Heart sounds: No murmur heard  Pulmonary:      Effort: Pulmonary effort is normal  No respiratory distress  Breath sounds: Normal breath sounds  Abdominal:      Palpations: Abdomen is soft  Tenderness: There is no abdominal tenderness  Musculoskeletal:         General: Swelling present  Cervical back: Neck supple  Skin:     General: Skin is warm and dry  Capillary Refill: Capillary refill takes less than 2 seconds  Comments: Left lower leg demonstrates swelling, some clear weeping, tenderness to palpation and warmth, mild erythema   Neurological:      General: No focal deficit present  Mental Status: She is alert and oriented to person, place, and time           Vital Signs  ED Triage Vitals   Temperature Pulse Respirations Blood Pressure SpO2   02/19/22 1316 02/19/22 1316 02/19/22 1316 02/19/22 1316 02/19/22 1316   98 1 °F (36 7 °C) 89 18 165/77 95 %      Temp Source Heart Rate Source Patient Position - Orthostatic VS BP Location FiO2 (%)   02/19/22 1500 02/19/22 1400 02/19/22 1400 02/19/22 1400 --   Temporal Monitor Lying Right arm       Pain Score       02/19/22 1400       No Pain           Vitals:    02/19/22 1316 02/19/22 1330 02/19/22 1400 02/19/22 1500   BP: 165/77 147/72 145/68 136/67   Pulse: 89 77 72 72   Patient Position - Orthostatic VS:   Lying Lying         Visual Acuity      ED Medications  Medications   cefTRIAXone (ROCEPHIN) IVPB (premix in dextrose) 2,000 mg 50 mL (0 mg Intravenous Stopped 2/19/22 1531)   cephalexin (KEFLEX) capsule 500 mg (500 mg Oral Given 2/19/22 0495)       Diagnostic Studies  Results Reviewed     Procedure Component Value Units Date/Time    Lactic acid [[id number]]  (Normal) Collected: 02/19/22 1337    Lab Status: Final result Specimen: Blood from Arm, Left Updated: 02/19/22 1412     LACTIC ACID 1 2 mmol/L     Narrative:      Result may be elevated if tourniquet was used during collection      Comprehensive metabolic panel [[id number]]  (Abnormal) Collected: 02/19/22 1337    Lab Status: Final result Specimen: Blood from Arm, Left Updated: 02/19/22 1408     Sodium 135 mmol/L      Potassium 4 1 mmol/L      Chloride 97 mmol/L      CO2 31 mmol/L      ANION GAP 7 mmol/L      BUN 15 mg/dL      Creatinine 0 82 mg/dL      Glucose 131 mg/dL      Calcium 8 9 mg/dL      AST 20 U/L      ALT 15 U/L      Alkaline Phosphatase 92 U/L      Total Protein 7 7 g/dL      Albumin 3 6 g/dL      Total Bilirubin 1 16 mg/dL      eGFR 60 ml/min/1 73sq m     Narrative:      [place] guidelines for Chronic Kidney Disease (CKD):     Stage 1 with normal or high GFR (GFR > 90 mL/min/1 73 square meters)    Stage 2 Mild CKD (GFR = 60-89 mL/min/1 73 square meters)    Stage 3A Moderate CKD (GFR = 45-59 mL/min/1 73 square meters)    Stage 3B Moderate CKD (GFR = 30-44 mL/min/1 73 square meters)    Stage 4 Severe CKD (GFR = 15-29 mL/min/1 73 square meters)    Stage 5 End Stage CKD (GFR <15 mL/min/1 73 square meters)  Note: GFR calculation is accurate only with a steady state creatinine    CBC and differential [[id number]]  (Abnormal) Collected: 02/19/22 1337    Lab Status: Final result Specimen: Blood from Arm, Left Updated: 02/19/22 1344     WBC 6 29 Thousand/uL      RBC 4 94 Million/uL      Hemoglobin 15 2 g/dL      Hematocrit 45 8 %      MCV 93 fL      MCH 30 8 pg      MCHC 33 2 g/dL      RDW 13 8 %      MPV 10 7 fL      Platelets 788 Thousands/uL      nRBC 0 /100 WBCs      Neutrophils Relative 41 %      Immat GRANS % 0 %      Lymphocytes Relative 40 %      Monocytes Relative 14 %      Eosinophils Relative 4 %      Basophils Relative 1 %      Neutrophils Absolute 2 60 Thousands/µL      Immature Grans Absolute 0 02 Thousand/uL      Lymphocytes Absolute 2 50 Thousands/µL      Monocytes Absolute 0 87 Thousand/µL      Eosinophils Absolute 0 26 Thousand/µL      Basophils Absolute 0 04 Thousands/µL                  XR tibia fibula 2 views LEFT   ED Interpretation by Beto Garcia PA-C (02/19 1417)   Unremarkable      XR ankle 3+ views LEFT   ED Interpretation by Beto Garcia PA-C (02/19 1419)   Unremarkable no fracture                 Procedures  Procedures         ED Course                                             MDM  Number of Diagnoses or Management Options     Amount and/or Complexity of Data Reviewed  Clinical lab tests: ordered and reviewed  Decide to obtain previous medical records or to obtain history from someone other than the patient: yes  Obtain history from someone other than the patient: yes  Review and summarize past medical records: yes  Independent visualization of images, tracings, or specimens: yes    Risk of Complications, Morbidity, and/or Mortality  Presenting problems: moderate  Diagnostic procedures: moderate  Management options: moderate    Patient Progress  Patient progress: stable      Disposition  Final diagnoses:   Cellulitis of left lower extremity     Time reflects when diagnosis was documented in both MDM as applicable and the Disposition within this note     Time User Action Codes Description Comment    2/19/2022  2:16 PM Kell Michaels Add [L03 116] Cellulitis of left lower extremity       ED Disposition     ED Disposition Condition Date/Time Comment    Discharge Stable Sat Feb 19, 2022  2:16 PM Manas Chapman discharge to home/self care              Follow-up Information     Follow up With Specialties Details Why Contact Info    Juan Culp MD Internal Medicine Schedule an appointment as soon as possible for a visit  As needed [address]  [address] [address] [address]  [phone]            Current Discharge Medication List      START taking these medications    Details   cephalexin (KEFLEX) 500 mg capsule Take 1 capsule (500 mg total) by mouth every 6 (six) hours for 7 days  Qty: 28 capsule, Refills: 0    Associated Diagnoses: Cellulitis of left lower extremity         CONTINUE these medications which have NOT CHANGED    Details   amLODIPine (NORVASC) 5 mg tablet Take 5 mg by mouth daily      apixaban (Eliquis) 2 5 mg Take by mouth      atorvastatin (LIPITOR) 20 mg tablet Take 20 mg by mouth daily      digoxin (LANOXIN) 0 125 mg tablet Take 125 mcg by mouth daily      metoprolol succinate (TOPROL-XL) 100 mg 24 hr tablet Take 100 mg by mouth daily      triamterene-hydrochlorothiazide (DYAZIDE) 37 5-25 mg per capsule Take 1 capsule by mouth every morning PT takes on Monday and Thursday             No discharge procedures on file      PDMP Review     None          ED Provider  Electronically Signed by           Jono Rush PA-C  02/19/22 8638

## 2022-03-29 ENCOUNTER — APPOINTMENT (EMERGENCY)
Dept: CT IMAGING | Facility: HOSPITAL | Age: 87
DRG: 522 | End: 2022-03-29
Payer: MEDICARE

## 2022-03-29 ENCOUNTER — APPOINTMENT (EMERGENCY)
Dept: RADIOLOGY | Facility: HOSPITAL | Age: 87
DRG: 522 | End: 2022-03-29
Payer: MEDICARE

## 2022-03-29 ENCOUNTER — HOSPITAL ENCOUNTER (INPATIENT)
Facility: HOSPITAL | Age: 87
LOS: 6 days | DRG: 522 | End: 2022-04-04
Attending: STUDENT IN AN ORGANIZED HEALTH CARE EDUCATION/TRAINING PROGRAM | Admitting: INTERNAL MEDICINE
Payer: MEDICARE

## 2022-03-29 DIAGNOSIS — S72.009A FEMORAL NECK FRACTURE (HCC): Primary | ICD-10-CM

## 2022-03-29 DIAGNOSIS — S72.032A: ICD-10-CM
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
## 2022-03-29 DIAGNOSIS — S42.212A FX HUMERAL NECK, LEFT, CLOSED, INITIAL ENCOUNTER: ICD-10-CM

## 2022-03-29 DIAGNOSIS — S09.90XA INJURY OF HEAD, INITIAL ENCOUNTER: ICD-10-CM

## 2022-03-29 LAB
2HR DELTA HS TROPONIN: 21 NG/L
ABO GROUP BLD: NORMAL
ABO GROUP BLD: NORMAL
ALBUMIN SERPL BCP-MCNC: 4.2 G/DL (ref 3.5–5)
ALP SERPL-CCNC: 94 U/L (ref 46–116)
ALT SERPL W P-5'-P-CCNC: 18 U/L (ref 12–78)
ANION GAP SERPL CALCULATED.3IONS-SCNC: 6 MMOL/L (ref 4–13)
AST SERPL W P-5'-P-CCNC: 29 U/L (ref 5–45)
BASOPHILS # BLD AUTO: 0.03 THOUSANDS/ΜL (ref 0–0.1)
BASOPHILS NFR BLD AUTO: 0 % (ref 0–1)
BILIRUB SERPL-MCNC: 1.71 MG/DL (ref 0.2–1)
BLD GP AB SCN SERPL QL: NEGATIVE
BUN SERPL-MCNC: 13 MG/DL (ref 5–25)
CALCIUM SERPL-MCNC: 9.2 MG/DL (ref 8.3–10.1)
CARDIAC TROPONIN I PNL SERPL HS: 11 NG/L
CARDIAC TROPONIN I PNL SERPL HS: 32 NG/L
CHLORIDE SERPL-SCNC: 89 MMOL/L (ref 100–108)
CO2 SERPL-SCNC: 32 MMOL/L (ref 21–32)
CREAT SERPL-MCNC: 0.94 MG/DL (ref 0.6–1.3)
DIGOXIN SERPL-MCNC: 1.1 NG/ML (ref 0.8–2)
DIGOXIN SERPL-MCNC: 1.2 NG/ML (ref 0.8–2)
EOSINOPHIL # BLD AUTO: 0.14 THOUSAND/ΜL (ref 0–0.61)
EOSINOPHIL NFR BLD AUTO: 2 % (ref 0–6)
ERYTHROCYTE [DISTWIDTH] IN BLOOD BY AUTOMATED COUNT: 13.8 % (ref 11.6–15.1)
GFR SERPL CREATININE-BSD FRML MDRD: 51 ML/MIN/1.73SQ M
GLUCOSE SERPL-MCNC: 132 MG/DL (ref 65–140)
HCT VFR BLD AUTO: 52.2 % (ref 34.8–46.1)
HGB BLD-MCNC: 17.5 G/DL (ref 11.5–15.4)
IMM GRANULOCYTES # BLD AUTO: 0.07 THOUSAND/UL (ref 0–0.2)
IMM GRANULOCYTES NFR BLD AUTO: 1 % (ref 0–2)
LYMPHOCYTES # BLD AUTO: 3.55 THOUSANDS/ΜL (ref 0.6–4.47)
LYMPHOCYTES NFR BLD AUTO: 41 % (ref 14–44)
MCH RBC QN AUTO: 30.8 PG (ref 26.8–34.3)
MCHC RBC AUTO-ENTMCNC: 33.5 G/DL (ref 31.4–37.4)
MCV RBC AUTO: 92 FL (ref 82–98)
MONOCYTES # BLD AUTO: 0.68 THOUSAND/ΜL (ref 0.17–1.22)
MONOCYTES NFR BLD AUTO: 8 % (ref 4–12)
NEUTROPHILS # BLD AUTO: 4.12 THOUSANDS/ΜL (ref 1.85–7.62)
NEUTS SEG NFR BLD AUTO: 48 % (ref 43–75)
NRBC BLD AUTO-RTO: 0 /100 WBCS
PLATELET # BLD AUTO: 190 THOUSANDS/UL (ref 149–390)
PMV BLD AUTO: 9.8 FL (ref 8.9–12.7)
POTASSIUM SERPL-SCNC: 3.8 MMOL/L (ref 3.5–5.3)
PROT SERPL-MCNC: 8.5 G/DL (ref 6.4–8.2)
RBC # BLD AUTO: 5.68 MILLION/UL (ref 3.81–5.12)
RH BLD: POSITIVE
RH BLD: POSITIVE
SODIUM SERPL-SCNC: 127 MMOL/L (ref 136–145)
SPECIMEN EXPIRATION DATE: NORMAL
WBC # BLD AUTO: 8.59 THOUSAND/UL (ref 4.31–10.16)

## 2022-03-29 PROCEDURE — 96375 TX/PRO/DX INJ NEW DRUG ADDON: CPT

## 2022-03-29 PROCEDURE — 86850 RBC ANTIBODY SCREEN: CPT | Performed by: PHYSICIAN ASSISTANT

## 2022-03-29 PROCEDURE — 85025 COMPLETE CBC W/AUTO DIFF WBC: CPT | Performed by: PHYSICIAN ASSISTANT

## 2022-03-29 PROCEDURE — 93005 ELECTROCARDIOGRAM TRACING: CPT

## 2022-03-29 PROCEDURE — 70450 CT HEAD/BRAIN W/O DYE: CPT

## 2022-03-29 PROCEDURE — 73200 CT UPPER EXTREMITY W/O DYE: CPT

## 2022-03-29 PROCEDURE — 80162 ASSAY OF DIGOXIN TOTAL: CPT | Performed by: PHYSICIAN ASSISTANT

## 2022-03-29 PROCEDURE — 72170 X-RAY EXAM OF PELVIS: CPT

## 2022-03-29 PROCEDURE — 73700 CT LOWER EXTREMITY W/O DYE: CPT

## 2022-03-29 PROCEDURE — 71260 CT THORAX DX C+: CPT

## 2022-03-29 PROCEDURE — 86901 BLOOD TYPING SEROLOGIC RH(D): CPT | Performed by: PHYSICIAN ASSISTANT

## 2022-03-29 PROCEDURE — 99285 EMERGENCY DEPT VISIT HI MDM: CPT

## 2022-03-29 PROCEDURE — 81003 URINALYSIS AUTO W/O SCOPE: CPT | Performed by: PHYSICIAN ASSISTANT

## 2022-03-29 PROCEDURE — 84484 ASSAY OF TROPONIN QUANT: CPT | Performed by: PHYSICIAN ASSISTANT

## 2022-03-29 PROCEDURE — 36415 COLL VENOUS BLD VENIPUNCTURE: CPT | Performed by: PHYSICIAN ASSISTANT

## 2022-03-29 PROCEDURE — G1004 CDSM NDSC: HCPCS

## 2022-03-29 PROCEDURE — 73030 X-RAY EXAM OF SHOULDER: CPT

## 2022-03-29 PROCEDURE — 71045 X-RAY EXAM CHEST 1 VIEW: CPT

## 2022-03-29 PROCEDURE — 86900 BLOOD TYPING SEROLOGIC ABO: CPT | Performed by: PHYSICIAN ASSISTANT

## 2022-03-29 PROCEDURE — 96374 THER/PROPH/DIAG INJ IV PUSH: CPT

## 2022-03-29 PROCEDURE — 80162 ASSAY OF DIGOXIN TOTAL: CPT

## 2022-03-29 PROCEDURE — 80053 COMPREHEN METABOLIC PANEL: CPT | Performed by: PHYSICIAN ASSISTANT

## 2022-03-29 PROCEDURE — 73552 X-RAY EXAM OF FEMUR 2/>: CPT

## 2022-03-29 PROCEDURE — 74177 CT ABD & PELVIS W/CONTRAST: CPT

## 2022-03-29 PROCEDURE — 72125 CT NECK SPINE W/O DYE: CPT

## 2022-03-29 PROCEDURE — 99222 1ST HOSP IP/OBS MODERATE 55: CPT

## 2022-03-29 PROCEDURE — 99285 EMERGENCY DEPT VISIT HI MDM: CPT | Performed by: PHYSICIAN ASSISTANT

## 2022-03-29 RX ORDER — SENNOSIDES 8.6 MG
1 TABLET ORAL
Status: DISCONTINUED | OUTPATIENT
Start: 2022-03-29 | End: 2022-04-04 | Stop reason: HOSPADM

## 2022-03-29 RX ORDER — ACETAMINOPHEN 325 MG/1
650 TABLET ORAL EVERY 6 HOURS PRN
Status: DISCONTINUED | OUTPATIENT
Start: 2022-03-29 | End: 2022-04-04 | Stop reason: HOSPADM

## 2022-03-29 RX ORDER — AMLODIPINE BESYLATE 5 MG/1
5 TABLET ORAL DAILY
Status: DISCONTINUED | OUTPATIENT
Start: 2022-03-30 | End: 2022-04-04 | Stop reason: HOSPADM

## 2022-03-29 RX ORDER — METOPROLOL SUCCINATE 100 MG/1
100 TABLET, EXTENDED RELEASE ORAL DAILY
Status: DISCONTINUED | OUTPATIENT
Start: 2022-03-30 | End: 2022-04-04 | Stop reason: HOSPADM

## 2022-03-29 RX ORDER — POLYETHYLENE GLYCOL 3350 17 G/17G
17 POWDER, FOR SOLUTION ORAL DAILY PRN
Status: DISCONTINUED | OUTPATIENT
Start: 2022-03-29 | End: 2022-04-04 | Stop reason: HOSPADM

## 2022-03-29 RX ORDER — ATORVASTATIN CALCIUM 20 MG/1
20 TABLET, FILM COATED ORAL DAILY
Status: DISCONTINUED | OUTPATIENT
Start: 2022-03-30 | End: 2022-04-04 | Stop reason: HOSPADM

## 2022-03-29 RX ORDER — DIGOXIN 125 MCG
125 TABLET ORAL DAILY
Status: DISCONTINUED | OUTPATIENT
Start: 2022-03-30 | End: 2022-04-04 | Stop reason: HOSPADM

## 2022-03-29 RX ORDER — FENTANYL CITRATE 50 UG/ML
50 INJECTION, SOLUTION INTRAMUSCULAR; INTRAVENOUS ONCE
Status: COMPLETED | OUTPATIENT
Start: 2022-03-29 | End: 2022-03-29

## 2022-03-29 RX ORDER — HEPARIN SODIUM 5000 [USP'U]/ML
5000 INJECTION, SOLUTION INTRAVENOUS; SUBCUTANEOUS EVERY 8 HOURS SCHEDULED
Status: DISCONTINUED | OUTPATIENT
Start: 2022-03-29 | End: 2022-03-31

## 2022-03-29 RX ORDER — ONDANSETRON 2 MG/ML
4 INJECTION INTRAMUSCULAR; INTRAVENOUS EVERY 6 HOURS PRN
Status: DISCONTINUED | OUTPATIENT
Start: 2022-03-29 | End: 2022-04-04 | Stop reason: HOSPADM

## 2022-03-29 RX ORDER — OXYCODONE HYDROCHLORIDE 5 MG/1
2.5 TABLET ORAL EVERY 6 HOURS PRN
Status: DISCONTINUED | OUTPATIENT
Start: 2022-03-29 | End: 2022-04-04 | Stop reason: HOSPADM

## 2022-03-29 RX ORDER — SODIUM CHLORIDE 9 MG/ML
75 INJECTION, SOLUTION INTRAVENOUS CONTINUOUS
Status: DISCONTINUED | OUTPATIENT
Start: 2022-03-29 | End: 2022-03-31 | Stop reason: ALTCHOICE

## 2022-03-29 RX ADMIN — FENTANYL CITRATE 50 MCG: 50 INJECTION INTRAMUSCULAR; INTRAVENOUS at 20:05

## 2022-03-29 RX ADMIN — IOHEXOL 100 ML: 350 INJECTION, SOLUTION INTRAVENOUS at 19:26

## 2022-03-29 RX ADMIN — MORPHINE SULFATE 2 MG: 2 INJECTION, SOLUTION INTRAMUSCULAR; INTRAVENOUS at 19:03

## 2022-03-30 ENCOUNTER — ANESTHESIA EVENT (INPATIENT)
Dept: PERIOP | Facility: HOSPITAL | Age: 87
DRG: 522 | End: 2022-03-30
Payer: MEDICARE
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
## 2022-03-30 PROBLEM — S72.142A CLOSED COMMINUTED INTERTROCHANTERIC FRACTURE OF PROXIMAL END OF LEFT FEMUR (HCC): Status: ACTIVE | Noted: 2022-03-30

## 2022-03-30 PROBLEM — S72.22XA CLOSED LEFT SUBTROCHANTERIC FEMUR FRACTURE (HCC): Status: ACTIVE | Noted: 2022-03-30

## 2022-03-30 PROBLEM — S42.352A CLOSED COMMINUTED FRACTURE OF LEFT HUMERUS: Status: ACTIVE | Noted: 2022-03-30

## 2022-03-30 PROBLEM — E87.1 HYPONATREMIA: Status: ACTIVE | Noted: 2022-03-30
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
## 2022-03-30 PROBLEM — S72.032A: Status: ACTIVE | Noted: 2022-03-30

## 2022-03-30 LAB
4HR DELTA HS TROPONIN: 59 NG/L
ALBUMIN SERPL BCP-MCNC: 3.1 G/DL (ref 3.5–5)
ALP SERPL-CCNC: 63 U/L (ref 46–116)
ALT SERPL W P-5'-P-CCNC: 15 U/L (ref 12–78)
ANION GAP SERPL CALCULATED.3IONS-SCNC: 7 MMOL/L (ref 4–13)
AST SERPL W P-5'-P-CCNC: 21 U/L (ref 5–45)
BASOPHILS # BLD AUTO: 0.03 THOUSANDS/ΜL (ref 0–0.1)
BASOPHILS NFR BLD AUTO: 0 % (ref 0–1)
BILIRUB SERPL-MCNC: 2.13 MG/DL (ref 0.2–1)
BILIRUB UR QL STRIP: NEGATIVE
BUN SERPL-MCNC: 13 MG/DL (ref 5–25)
CALCIUM ALBUM COR SERPL-MCNC: 9.1 MG/DL (ref 8.3–10.1)
CALCIUM SERPL-MCNC: 8.4 MG/DL (ref 8.3–10.1)
CARDIAC TROPONIN I PNL SERPL HS: 70 NG/L
CARDIAC TROPONIN I PNL SERPL HS: 77 NG/L (ref 8–18)
CHLORIDE SERPL-SCNC: 90 MMOL/L (ref 100–108)
CLARITY UR: CLEAR
CO2 SERPL-SCNC: 32 MMOL/L (ref 21–32)
COLOR UR: YELLOW
CREAT SERPL-MCNC: 0.97 MG/DL (ref 0.6–1.3)
EOSINOPHIL # BLD AUTO: 0.1 THOUSAND/ΜL (ref 0–0.61)
EOSINOPHIL NFR BLD AUTO: 1 % (ref 0–6)
ERYTHROCYTE [DISTWIDTH] IN BLOOD BY AUTOMATED COUNT: 13.8 % (ref 11.6–15.1)
GFR SERPL CREATININE-BSD FRML MDRD: 49 ML/MIN/1.73SQ M
GLUCOSE SERPL-MCNC: 150 MG/DL (ref 65–140)
GLUCOSE UR STRIP-MCNC: NEGATIVE MG/DL
HCT VFR BLD AUTO: 44.7 % (ref 34.8–46.1)
HGB BLD-MCNC: 15.2 G/DL (ref 11.5–15.4)
HGB UR QL STRIP.AUTO: NEGATIVE
IMM GRANULOCYTES # BLD AUTO: 0.07 THOUSAND/UL (ref 0–0.2)
IMM GRANULOCYTES NFR BLD AUTO: 1 % (ref 0–2)
KETONES UR STRIP-MCNC: NEGATIVE MG/DL
LEUKOCYTE ESTERASE UR QL STRIP: NEGATIVE
LYMPHOCYTES # BLD AUTO: 1.36 THOUSANDS/ΜL (ref 0.6–4.47)
LYMPHOCYTES NFR BLD AUTO: 13 % (ref 14–44)
MCH RBC QN AUTO: 31.1 PG (ref 26.8–34.3)
MCHC RBC AUTO-ENTMCNC: 34 G/DL (ref 31.4–37.4)
MCV RBC AUTO: 92 FL (ref 82–98)
MONOCYTES # BLD AUTO: 0.62 THOUSAND/ΜL (ref 0.17–1.22)
MONOCYTES NFR BLD AUTO: 6 % (ref 4–12)
NEUTROPHILS # BLD AUTO: 8.38 THOUSANDS/ΜL (ref 1.85–7.62)
NEUTS SEG NFR BLD AUTO: 79 % (ref 43–75)
NITRITE UR QL STRIP: NEGATIVE
NRBC BLD AUTO-RTO: 0 /100 WBCS
PH UR STRIP.AUTO: 7 [PH]
PLATELET # BLD AUTO: 161 THOUSANDS/UL (ref 149–390)
PMV BLD AUTO: 9.5 FL (ref 8.9–12.7)
POTASSIUM SERPL-SCNC: 4 MMOL/L (ref 3.5–5.3)
PROT SERPL-MCNC: 6.5 G/DL (ref 6.4–8.2)
PROT UR STRIP-MCNC: NEGATIVE MG/DL
RBC # BLD AUTO: 4.88 MILLION/UL (ref 3.81–5.12)
SODIUM SERPL-SCNC: 129 MMOL/L (ref 136–145)
SP GR UR STRIP.AUTO: 1.01 (ref 1–1.03)
UROBILINOGEN UR QL STRIP.AUTO: 0.2 E.U./DL
WBC # BLD AUTO: 10.56 THOUSAND/UL (ref 4.31–10.16)

## 2022-03-30 PROCEDURE — 84484 ASSAY OF TROPONIN QUANT: CPT | Performed by: INTERNAL MEDICINE

## 2022-03-30 PROCEDURE — 80053 COMPREHEN METABOLIC PANEL: CPT

## 2022-03-30 PROCEDURE — 84484 ASSAY OF TROPONIN QUANT: CPT | Performed by: PHYSICIAN ASSISTANT

## 2022-03-30 PROCEDURE — 85025 COMPLETE CBC W/AUTO DIFF WBC: CPT

## 2022-03-30 PROCEDURE — 99223 1ST HOSP IP/OBS HIGH 75: CPT | Performed by: ORTHOPAEDIC SURGERY

## 2022-03-30 PROCEDURE — 23600 CLTX PROX HUMRL FX W/O MNPJ: CPT | Performed by: ORTHOPAEDIC SURGERY

## 2022-03-30 PROCEDURE — 99233 SBSQ HOSP IP/OBS HIGH 50: CPT | Performed by: INTERNAL MEDICINE

## 2022-03-30 RX ADMIN — SODIUM CHLORIDE 75 ML/HR: 0.9 INJECTION, SOLUTION INTRAVENOUS at 12:51

## 2022-03-30 RX ADMIN — HEPARIN SODIUM 5000 UNITS: 5000 INJECTION INTRAVENOUS; SUBCUTANEOUS at 05:55

## 2022-03-30 RX ADMIN — HEPARIN SODIUM 5000 UNITS: 5000 INJECTION INTRAVENOUS; SUBCUTANEOUS at 14:01

## 2022-03-30 RX ADMIN — SODIUM CHLORIDE 75 ML/HR: 0.9 INJECTION, SOLUTION INTRAVENOUS at 00:04

## 2022-03-30 RX ADMIN — METOPROLOL SUCCINATE 100 MG: 100 TABLET, EXTENDED RELEASE ORAL at 08:27

## 2022-03-30 RX ADMIN — ACETAMINOPHEN 325MG 650 MG: 325 TABLET ORAL at 08:26

## 2022-03-30 RX ADMIN — DIGOXIN 125 MCG: 125 TABLET ORAL at 08:26

## 2022-03-30 RX ADMIN — OXYCODONE HYDROCHLORIDE 2.5 MG: 5 TABLET ORAL at 12:05

## 2022-03-30 RX ADMIN — HEPARIN SODIUM 5000 UNITS: 5000 INJECTION INTRAVENOUS; SUBCUTANEOUS at 00:04

## 2022-03-30 RX ADMIN — HEPARIN SODIUM 5000 UNITS: 5000 INJECTION INTRAVENOUS; SUBCUTANEOUS at 22:09

## 2022-03-30 RX ADMIN — AMLODIPINE BESYLATE 5 MG: 5 TABLET ORAL at 08:27

## 2022-03-30 RX ADMIN — ATORVASTATIN CALCIUM 20 MG: 20 TABLET, FILM COATED ORAL at 08:27

## 2022-03-30 NOTE — ED NOTES
Reports called to ICU  Pt transported to ICU by N[facility] supervisor and security   No s/s of acute distress, VS stable, A&Ox4     Agnieszka Todd RN  03/29/22 [id number]

## 2022-03-30 NOTE — PLAN OF CARE
Problem: MOBILITY - ADULT  Goal: Maintain or return to baseline ADL function  Description: INTERVENTIONS:  -  Assess patient's ability to carry out ADLs; assess patient's baseline for ADL function and identify physical deficits which impact ability to perform ADLs (bathing, care of mouth/teeth, toileting, grooming, dressing, etc )  - Assess/evaluate cause of self-care deficits   - Assess range of motion  - Assess patient's mobility; develop plan if impaired  - Assess patient's need for assistive devices and provide as appropriate  - Encourage maximum independence but intervene and supervise when necessary  - Involve family in performance of ADLs  - Assess for home care needs following discharge   - Consider OT consult to assist with ADL evaluation and planning for discharge  - Provide patient education as appropriate  Outcome: Progressing  Goal: Maintains/Returns to pre admission functional level  Description: INTERVENTIONS:  - Perform BMAT or MOVE assessment daily    - Set and communicate daily mobility goal to care team and patient/family/caregiver  - Collaborate with rehabilitation services on mobility goals if consulted  - Perform Range of Motion   times a day  - Reposition patient every   hours  - Dangle patient   times a day  - Stand patient   times a day  - Ambulate patient   times a day  - Out of bed to chair   times a day   - Out of bed for meals     times a day  - Out of bed for toileting  - Record patient progress and toleration of activity level   Outcome: Progressing     Problem: Prexisting or High Potential for Compromised Skin Integrity  Goal: Skin integrity is maintained or improved  Description: INTERVENTIONS:  - Identify patients at risk for skin breakdown  - Assess and monitor skin integrity  - Assess and monitor nutrition and hydration status  - Monitor labs   - Assess for incontinence   - Turn and reposition patient  - Assist with mobility/ambulation  - Relieve pressure over bony prominences  - Avoid friction and shearing  - Provide appropriate hygiene as needed including keeping skin clean and dry  - Evaluate need for skin moisturizer/barrier cream  - Collaborate with interdisciplinary team   - Patient/family teaching  - Consider wound care consult   Outcome: Progressing     Problem: Potential for Falls  Goal: Patient will remain free of falls  Description: INTERVENTIONS:  - Educate patient/family on patient safety including physical limitations  - Instruct patient to call for assistance with activity   - Consult OT/PT to assist with strengthening/mobility   - Keep Call bell within reach  - Keep bed low and locked with side rails adjusted as appropriate  - Keep care items and personal belongings within reach  - Initiate and maintain comfort rounds  - Make Fall Risk Sign visible to staff  - Offer Toileting every   Hours, in advance of need  - Initiate/Maintain   alarm  - Obtain necessary fall risk management equipment:     - Apply yellow socks and bracelet for high fall risk patients  - Consider moving patient to room near nurses station  Outcome: Progressing

## 2022-03-30 NOTE — ED PROVIDER NOTES
Emergency Department Trauma Note  Carli Lucas 80 y o  female MRN: [id number]  Unit/Bed#: /-01 Encounter: [id number]      Trauma Alert: Trauma Acuity: Trauma Evaluation  Model of Arrival: Mode of Arrival: BLS via    Trauma Team: Current Providers  Attending Provider: Diana Hamilton DO  Attending Provider: Eduard Lama DO  Physician Assistant: Sanjuanita Cho PA-C  Registered Nurse: Stephen Qureshi RN  Patient Care Assistant: Lo Cervantes  Consultants:     None      History of Present Illness     Chief Complaint:   Chief Complaint   Patient presents with    Fall     Pt comes from home when coming out of the bathroom she fell, striking head on carpeted floor and c/o L leg pain and L shoulder pain  +headstrike -LOc +eliquis     HPI:  Carli Lucas is a 80 y o  female who presents with fall  Mechanism:Details of Incident: Pt fell while getting out of bathroom onto carpeted floor +headstrike -LOC +eliquis Injury Date: 03/29/22        The patient is a 80year old female, PMH afib, on eliquis, HTN who presents to the ED with the c/o fall, left shoulder pain and left leg pain via EMS from home  Patient states that prior to arrival she was in the bathroom, getting up from using the toilet and lost her balance falling on her left side  She states she did hit the ground and her head but denies any loss of consciousness  Patient is on Eliquis  She is having severe pain in her left  She states she is also having pain in her left leg and was unable to get up from the fall    Denies any headache or neck pain any shortness of breath abdominal pain nausea vomiting or back pain      Fall  Mechanism of injury: fall    Injury location:  Shoulder/arm and leg  Shoulder/arm injury location:  L arm  Leg injury location:  L leg and L hip  Incident location:  Home  Arrived directly from scene: yes    Fall:     Fall occurred:  Tripped    Impact surface:  Hard floor    Point of impact:  Unable to specify Entrapped after fall: yes    Suspicion of alcohol use: no    Suspicion of drug use: no    Tetanus status:  Unknown  Prior to arrival data:     Bystander interventions:  None    Patient ambulatory at scene: no      Blood loss:  None    Responsiveness at scene:  Alert    Loss of consciousness: no      Amnesic to event: no      Airway interventions:  None    IV access status:  Established    IO access:  None    Cardiac interventions:  None    Medications administered:  None    Immobilization:  None    Airway condition since incident:  Stable    Breathing condition since incident:  Stable    Circulation condition since incident:  Stable    Mental status condition since incident:  Stable    Disability condition since incident:  Stable  Current pain details:     Pain quality:  Unable to specify    Pain Severity:  Severe    Pain timing:  Constant  Associated symptoms: no abdominal pain, no back pain, no chest pain, no difficulty breathing, no headaches, no hearing loss, no loss of consciousness, no nausea, no neck pain, no seizures and no vomiting    Risk factors: anticoagulation therapy      Review of Systems   Constitutional: Negative for chills and fever  HENT: Negative for ear pain, hearing loss and sore throat  Eyes: Negative for pain and visual disturbance  Respiratory: Negative for cough, shortness of breath and wheezing  Cardiovascular: Negative for chest pain, palpitations and leg swelling  Gastrointestinal: Negative for abdominal pain, nausea and vomiting  Genitourinary: Negative for dysuria and hematuria  Musculoskeletal: Negative for arthralgias, back pain and neck pain  Skin: Negative for color change and rash  Neurological: Negative for dizziness, seizures, loss of consciousness, syncope and headaches  All other systems reviewed and are negative  Historical Information     Immunizations: There is no immunization history on file for this patient      No past medical history on file   No family history on file  No past surgical history on file  Social History     Tobacco Use    Smoking status: Never Smoker    Smokeless tobacco: Never Used   Vaping Use    Vaping Use: Never used   Substance Use Topics    Alcohol use: Not Currently    Drug use: Not Currently     E-Cigarette/Vaping    E-Cigarette Use Never User      E-Cigarette/Vaping Substances    Nicotine No     THC No     CBD No     Flavoring No     Other No     Unknown No        Family History: non-contributory    Meds/Allergies   Prior to Admission Medications   Prescriptions Last Dose Informant Patient Reported? Taking? amLODIPine (NORVASC) 5 mg tablet   Yes No   Sig: Take 5 mg by mouth daily   apixaban (Eliquis) 2 5 mg   Yes No   Sig: Take by mouth   atorvastatin (LIPITOR) 20 mg tablet   Yes No   Sig: Take 20 mg by mouth daily   digoxin (LANOXIN) 0 125 mg tablet   Yes No   Sig: Take 125 mcg by mouth daily   metoprolol succinate (TOPROL-XL) 100 mg 24 hr tablet   Yes No   Sig: Take 100 mg by mouth daily   triamterene-hydrochlorothiazide (DYAZIDE) 37 5-25 mg per capsule   Yes No   Sig: Take 1 capsule by mouth every morning PT takes on Monday and Thursday      Facility-Administered Medications: None       Allergies   Allergen Reactions    Ace Inhibitors Other (See Comments)     unknown    Nitrofurantoin Other (See Comments)     unknown       PHYSICAL EXAM    PE limited by: n/a    Objective   Vitals:   First set: Temperature: 98 4 °F (36 9 °C) (03/29/22 1845)  Pulse: 73 (03/29/22 1845)  Respirations: 20 (03/29/22 1845)  Blood Pressure: (!) 203/92 (03/29/22 1845)  SpO2: 94 % (03/29/22 1845)    Primary Survey:   (A) Airway: normal  (B) Breathing: normal  (C) Circulation: Pulses:   normal  (D) Disabliity:  GCS Total:  15  (E) Expose:  Completed    Secondary Survey: (Click on Physical Exam tab above)  Physical Exam  Vitals and nursing note reviewed  Constitutional:       General: She is not in acute distress       Appearance: She is well-developed  Interventions: Cervical collar in place  Comments: Placed in cervical collar    HENT:      Head: Normocephalic and atraumatic  Eyes:      Extraocular Movements: Extraocular movements intact  Conjunctiva/sclera: Conjunctivae normal       Pupils: Pupils are equal, round, and reactive to light  Neck:      Trachea: Trachea normal    Cardiovascular:      Rate and Rhythm: Normal rate and regular rhythm  Heart sounds: No murmur heard  Pulmonary:      Effort: Pulmonary effort is normal  No respiratory distress  Breath sounds: Normal breath sounds  Abdominal:      Palpations: Abdomen is soft  Tenderness: There is no abdominal tenderness  There is no right CVA tenderness or left CVA tenderness  Musculoskeletal:      Left upper arm: Tenderness and bony tenderness present  Left elbow: Normal       Cervical back: Normal and neck supple  No spinous process tenderness or muscular tenderness  Thoracic back: Normal       Lumbar back: Normal       Left hip: Bony tenderness present  Left upper leg: Tenderness present  Skin:     General: Skin is warm and dry  Neurological:      Mental Status: She is alert and oriented to person, place, and time  GCS: GCS eye subscore is 4  GCS verbal subscore is 5  GCS motor subscore is 6  Cervical spine cleared by clinical criteria?  No (imaging required)      Invasive Devices  Report    Peripheral Intravenous Line            Peripheral IV 03/29/22 Right Antecubital <1 day          Drain            Urethral Catheter 18 Fr  <1 day                Lab Results:   Results Reviewed     Procedure Component Value Units Date/Time    HS Troponin I 2hr [[id number]]  (Abnormal) Collected: 03/29/22 2107    Lab Status: Final result Specimen: Blood from Arm, Right Updated: 03/29/22 2145     hs TnI 2hr 32 ng/L      Delta 2hr hsTnI 21 ng/L     Digoxin level [[id number]]  (Normal) Collected: 03/29/22 1901    Lab Status: Final result Specimen: Blood from Arm, Right Updated: 03/29/22 2141     Digoxin Lvl 1 2 ng/mL     HS Troponin I 4hr [[id number]]     Lab Status: No result Specimen: Blood     Comprehensive metabolic panel [[id number]]  (Abnormal) Collected: 03/29/22 1901    Lab Status: Final result Specimen: Blood from Arm, Right Updated: 03/29/22 2001     Sodium 127 mmol/L      Potassium 3 8 mmol/L      Chloride 89 mmol/L      CO2 32 mmol/L      ANION GAP 6 mmol/L      BUN 13 mg/dL      Creatinine 0 94 mg/dL      Glucose 132 mg/dL      Calcium 9 2 mg/dL      AST 29 U/L      ALT 18 U/L      Alkaline Phosphatase 94 U/L      Total Protein 8 5 g/dL      Albumin 4 2 g/dL      Total Bilirubin 1 71 mg/dL      eGFR 51 ml/min/1 73sq m     Narrative:      [facility] guidelines for Chronic Kidney Disease (CKD):     Stage 1 with normal or high GFR (GFR > 90 mL/min/1 73 square meters)    Stage 2 Mild CKD (GFR = 60-89 mL/min/1 73 square meters)    Stage 3A Moderate CKD (GFR = 45-59 mL/min/1 73 square meters)    Stage 3B Moderate CKD (GFR = 30-44 mL/min/1 73 square meters)    Stage 4 Severe CKD (GFR = 15-29 mL/min/1 73 square meters)    Stage 5 End Stage CKD (GFR <15 mL/min/1 73 square meters)  Note: GFR calculation is accurate only with a steady state creatinine    UA w Reflex to Microscopic w Reflex to Culture [[id number]] Collected: 03/29/22 2000    Lab Status: No result Specimen: Urine, Indwelling Infante Catheter     HS Troponin 0hr (reflex protocol) [[id number]]  (Normal) Collected: 03/29/22 1901    Lab Status: Final result Specimen: Blood from Arm, Right Updated: 03/29/22 1936     hs TnI 0hr 11 ng/L     CBC and differential [[id number]]  (Abnormal) Collected: 03/29/22 1901    Lab Status: Final result Specimen: Blood from Arm, Right Updated: 03/29/22 1906     WBC 8 59 Thousand/uL      RBC 5 68 Million/uL      Hemoglobin 17 5 g/dL      Hematocrit 52 2 %      MCV 92 fL      MCH 30 8 pg      MCHC 33 5 g/dL      RDW 13 8 % MPV 9 8 fL      Platelets 393 Thousands/uL      nRBC 0 /100 WBCs      Neutrophils Relative 48 %      Immat GRANS % 1 %      Lymphocytes Relative 41 %      Monocytes Relative 8 %      Eosinophils Relative 2 %      Basophils Relative 0 %      Neutrophils Absolute 4 12 Thousands/µL      Immature Grans Absolute 0 07 Thousand/uL      Lymphocytes Absolute 3 55 Thousands/µL      Monocytes Absolute 0 68 Thousand/µL      Eosinophils Absolute 0 14 Thousand/µL      Basophils Absolute 0 03 Thousands/µL                  Imaging Studies:   Direct to CT: No  CT upper extremity wo contrast left   Final Result by Evelio Hidalgo MD (03/29 2210)      Comminuted impacted fracture at the surgical neck of the left humerus         Workstation performed: [id number]         CT lower extremity wo contrast left   Final Result by Evelio Hidalgo MD (03/29 2206)      Impacted transcervical fracture at the left proximal femur with varus angulation and external rotation         Workstation performed: [id number]         TRAUMA - CT head wo contrast   Final Result by Zaynab Jamil DO (03/29 2023)      No intracranial hemorrhage or calvarial fracture  I personally discussed this study with Brndstr Player on 3/29/2022 at 8:06 PM             Workstation performed: [id number]         TRAUMA - CT spine cervical wo contrast   Final Result by Zaynab Jamil DO (03/29 2023)      No cervical spine fracture or traumatic malalignment  I personally discussed this study with Brndstr Player on 3/29/2022 at 8:06 PM             Workstation performed: [id number]         TRAUMA - CT chest abdomen pelvis w contrast   Final Result by Zaynab Jamil DO (03/29 2007)      Comminuted impacted left humeral neck fracture  Impacted subcapital femoral neck fracture  Nonacute findings, as described                  I personally discussed this study with Brndstr Player on 3/29/2022 at 8:06 PM                   Workstation performed: [id number]         XR shoulder 2+ views LEFT   ED Interpretation by Victoria Ontiveros PA-C (03/29 2000)   Left humeral fracture      XR Trauma chest portable    (Results Pending)   XR Trauma pelvis ap only 1 or 2 vw    (Results Pending)   XR femur 2 views LEFT    (Results Pending)         Procedures  ECG 12 Lead Documentation Only    Date/Time: 3/29/2022 10:11 PM  Performed by: Victoria Ontiveros PA-C  Authorized by: Victoria Ontiveros PA-C     Indications / Diagnosis:  Trauma   ECG reviewed by me, the ED Provider: yes    Patient location:  ED  Previous ECG:     Previous ECG:  Unavailable  Interpretation:     Interpretation: abnormal    Rate:     ECG rate:  80  Rhythm:     Rhythm: atrial fibrillation               ED Course  ED Course as of 03/29/22 2212   Tue Mar 29, 2022   2001 Sodium(!): 127   2005 Left femural neck fracture and left humeral neck    2008 Cervical Collar Clearance: The patient had a CT scan of the cervical spine demonstrating no acute injury  On exam, the patient had no midline point tenderness or paresthesias/numbness/weakness in the extremities  The patient had full range of motion (was then able to flex, extend, and rotate head laterally) without pain  There were no distracting injuries and the patient was not intoxicated  The patient's cervical spine was cleared radiologically and clinically  Cervical collar removed at this time       Victoria Ontiveros PA-C  3/29/2022 8:08 PM        2022 Dr Augustus Councilman with ortho, recommended reaching out to anesthesia and obtaining a CT of arm and hip    2059 Dr Augustus Councilman  recommended to admit to the hospitalist service, consult Dr Nura Munguia will see in AM            MDM  Number of Diagnoses or Management Options     Amount and/or Complexity of Data Reviewed  Clinical lab tests: ordered and reviewed  Tests in the radiology section of CPT®: ordered and reviewed  Decide to obtain previous medical records or to obtain history from someone other than the patient: yes  Obtain history from someone other than the patient: yes  Review and summarize past medical records: yes  Discuss the patient with other providers: yes  Independent visualization of images, tracings, or specimens: yes    Risk of Complications, Morbidity, and/or Mortality  Presenting problems: high  Diagnostic procedures: high  Management options: high    Patient Progress  Patient progress: stable          Disposition  [facility] Transfer: No  Final diagnoses:   Femoral neck fracture ([facility] [address] )   Fx humeral neck, left, closed, initial encounter   Fall, initial encounter   Injury of head, initial encounter     Time reflects when diagnosis was documented in both MDM as applicable and the Disposition within this note     Time User Action Codes Description Comment    3/29/2022  8:08 PM Roberto Brendan Add [S72 009A] Femoral neck fracture ([facility] [address] )     3/29/2022  8:08 PM Roberto Brendan Add [S42 212A] Fx humeral neck, left, closed, initial encounter     3/29/2022  8:08 PM Roberto Brendan Add [W19  ZZIT] Fall, initial encounter     3/29/2022  8:08 PM Roberto Brendan Add [S09 90XA] Injury of head, initial encounter       ED Disposition     ED Disposition Condition Date/Time Comment    Admit Stable Tue Mar 29, 2022  9:01 PM Case was discussed with evon  and the patient's admission status was agreed to be Admission Status: inpatient status to the service of Dr barnard           Follow-up Information    None       Current Discharge Medication List      CONTINUE these medications which have NOT CHANGED    Details   amLODIPine (NORVASC) 5 mg tablet Take 5 mg by mouth daily      apixaban (Eliquis) 2 5 mg Take by mouth      atorvastatin (LIPITOR) 20 mg tablet Take 20 mg by mouth daily      digoxin (LANOXIN) 0 125 mg tablet Take 125 mcg by mouth daily      metoprolol succinate (TOPROL-XL) 100 mg 24 hr tablet Take 100 mg by mouth daily      triamterene-hydrochlorothiazide (DYAZIDE) 37 5-25 mg per capsule Take 1 capsule by mouth every morning PT takes on Monday and Thursday           No discharge procedures on file      PDMP Review     None          ED Provider  Electronically Signed by         Sammie Saab PA-C  03/29/22 7207

## 2022-03-30 NOTE — ASSESSMENT & PLAN NOTE
- mild hyponatremia on admission, suspect secondary to decreased p o   Intake  - improving without intervention  - for now will monitor only with daily BMP

## 2022-03-30 NOTE — ASSESSMENT & PLAN NOTE
· BP initially 203/92, suspect acute pain contributing     · Per report from family, patient hasn't been taking blood pressure meds  · Continue Norvasc, Metoprolol   · Continue pain control

## 2022-03-30 NOTE — NURSING NOTE
Patient arrived to floor via stretcher from ED  Patient on 2L NC having pain on L side  Transferred to bed 303  Dual skin assessment completed with Jesica Prescott RN  No open areas noted  Sacrum and heels are intact  Swelling noted in L shoulder  Patient LUE in immobilizer  Will continue to monitor patient

## 2022-03-30 NOTE — PLAN OF CARE
Problem: MOBILITY - ADULT  Goal: Maintain or return to baseline ADL function  Description: INTERVENTIONS:  -  Assess patient's ability to carry out ADLs; assess patient's baseline for ADL function and identify physical deficits which impact ability to perform ADLs (bathing, care of mouth/teeth, toileting, grooming, dressing, etc )  - Assess/evaluate cause of self-care deficits   - Assess range of motion  - Assess patient's mobility; develop plan if impaired  - Assess patient's need for assistive devices and provide as appropriate  - Encourage maximum independence but intervene and supervise when necessary  - Involve family in performance of ADLs  - Assess for home care needs following discharge   - Consider OT consult to assist with ADL evaluation and planning for discharge  - Provide patient education as appropriate  3/29/2022 2322 by Kenyon Henson RN  Outcome: Progressing  3/29/2022 2322 by Kenyon Henson RN  Outcome: Progressing  Goal: Maintains/Returns to pre admission functional level  Description: INTERVENTIONS:  - Perform BMAT or MOVE assessment daily    - Set and communicate daily mobility goal to care team and patient/family/caregiver  - Collaborate with rehabilitation services on mobility goals if consulted  - Perform Range of Motion 2 times a day  - Reposition patient every 2 hours    - Dangle patient 2 times a day  - Record patient progress and toleration of activity level   3/29/2022 2322 by Kenyon Henson RN  Outcome: Progressing  3/29/2022 2322 by Kenyon Henson RN  Outcome: Progressing     Problem: Prexisting or High Potential for Compromised Skin Integrity  Goal: Skin integrity is maintained or improved  Description: INTERVENTIONS:  - Identify patients at risk for skin breakdown  - Assess and monitor skin integrity  - Assess and monitor nutrition and hydration status  - Monitor labs   - Assess for incontinence   - Turn and reposition patient  - Assist with mobility/ambulation  - Relieve pressure over bony prominences  - Avoid friction and shearing  - Provide appropriate hygiene as needed including keeping skin clean and dry  - Evaluate need for skin moisturizer/barrier cream  - Collaborate with interdisciplinary team   - Patient/family teaching  - Consider wound care consult   3/29/2022 2322 by Eleanor Omalley RN  Outcome: Progressing  3/29/2022 2322 by Eleanor Omalley RN  Outcome: Progressing     Problem: Potential for Falls  Goal: Patient will remain free of falls  Description: INTERVENTIONS:  - Educate patient/family on patient safety including physical limitations  - Instruct patient to call for assistance with activity   - Consult OT/PT to assist with strengthening/mobility   - Keep Call bell within reach  - Keep bed low and locked with side rails adjusted as appropriate  - Keep care items and personal belongings within reach  - Initiate and maintain comfort rounds  - Make Fall Risk Sign visible to staff  - Offer Toileting every 2 Hours, in advance of need  - Initiate/Maintain bed alarm  - Obtain necessary fall risk management equipment: arm band  - Apply yellow socks and bracelet for high fall risk patients  - Consider moving patient to room near nurses station  3/29/2022 2322 by Eleanor Omalley RN  Outcome: Progressing  3/29/2022 2322 by Eleanor Omalley RN  Outcome: Progressing

## 2022-03-30 NOTE — ED NOTES
PT son at bedside   No s/s of acute distress, VS stable, will continue to monitor      Erica Cortez RN  03/29/22 2037

## 2022-03-30 NOTE — ASSESSMENT & PLAN NOTE
· S/p fall on left side  · CT left upper extremity confirms left humerus fracture  · Left arm with swelling noted  · Consult placed to orthopedics  · Maintain sling to left arm   · PRN pain control

## 2022-03-30 NOTE — CASE MANAGEMENT
Case Management Assessment & Discharge Planning Note    Patient name Ronel Florez  Location /-75 MRN [id number]  : 1925 Date 3/30/2022       Current Admission Date: 3/29/2022  Current Admission Diagnosis:Transcervical fracture of left femur, closed, initial encounter [facility])   Patient Active Problem List    Diagnosis Date Noted    Closed comminuted fracture of left humerus 2022    Fall at home 2022    Transcervical fracture of left femur, closed, initial encounter ([facility] ) 2022    Hyponatremia 2022    Hypertensive urgency     Atrial fibrillation ([facility] )     Elevated troponin       LOS (days): 1  Geometric Mean LOS (GMLOS) (days): 2 90  Days to GMLOS:2 3     OBJECTIVE:    Risk of Unplanned Readmission Score: 9         Current admission status: Inpatient       [facility] Pharmacy:    [address] [place] [place]  [facility]  [address] [facility] [address]  Phone: [phone] Fax: [phone] AID-10 [id number] [facility], [address]  [facility] [place] [address]  Phone: [phone] Fax:  [phone] AID-44 [id number], [place], [address]  [address]  [address] [facility] [address]  Phone: [phone] Fax: [phone]    Primary Care Provider: Trish Louise MD    Primary Insurance: MEDICARE  Secondary Insurance: [facility]    ASSESSMENT:  Ning 26 Proxies    There are no active Health Care Proxies on file         Advance Directives  Does patient have a [address]?: Yes (karoline tran)  Does patient have Advance Directives?: Yes  Advance Directives: Power of  for health care,Power of  for finance  Primary Contact: karoline tran              Patient Information  Admitted from[de-identified] Home  Mental Status: Alert  During Assessment patient was accompanied by: Not accompanied during assessment  Assessment information provided by[de-identified] Patient  Primary Caregiver: Self  Support Systems: [address] of Residence: [facility] entry access options   Select all that apply : Stairs  Number of steps to enter home : 2  Type of Current Residence: 2 story home  Upon entering residence, is there a bedroom on the main floor (no further steps)?: Yes  Upon entering residence, is there a bathroom on the main floor (no further steps)?: Yes  In the last 12 months, was there a time when you were not able to pay the mortgage or rent on time?: No  In the last 12 months, how many places have you lived?: 1  In the last 12 months, was there a time when you did not have a steady place to sleep or slept in a shelter (including now)?: No  Living Arrangements: Lives w/ Son    Activities of Daily Living Prior to Admission  Functional Status: Independent  Completes ADLs independently?: Yes  Ambulates independently?: Yes  Does patient use assisted devices?: Yes  Assisted Devices (DME) used: Charu Nelson  Does patient currently own DME?: Yes  What DME does the patient currently own?: Charu Nelson  Does patient have a history of Outpatient Therapy (PT/OT)?: No  Does the patient have a history of Short-Term Rehab?: Yes ([facility]s [facility])  Does patient have a history of HHC?: Yes (pt unable to remember name of agency)  Does patient currently have [address]?: No         Patient Information Continued  Income Source: Pension/FCI  Does patient have prescription coverage?: Yes  Within the past 12 months, you worried that your food would run out before you got the money to buy more : Never true  Within the past 12 months, the food you bought just didnt last and you didnt have money to get more : Never true  Food insecurity resource given?: No  Does patient receive dialysis treatments?: Yes  Does patient have a history of substance abuse?: No  Does patient have a history of Mental Health Diagnosis?: No         Means of Transportation  Means of Transport to [facility][de-identified] Family transport  In the past 12 months, has lack of transportation kept you from medical appointments or from getting medications?: No  In the past 12 months, has lack of transportation kept you from meetings, work, or from getting things needed for daily living?: No        DISCHARGE DETAILS:    Discharge planning discussed with[de-identified] patient  Freedom of Choice: Yes                   Contacts  Patient Contacts: karoline tran  Relationship to Patient[de-identified] Family                   Would you like to participate in our [address] service program?  : No - Declined                 anticipated OR tomorrow, await PT/OT evaluation following surgery to determine discharge plan

## 2022-03-30 NOTE — ASSESSMENT & PLAN NOTE
- blood pressure much better control now back on home Norvasc and metoprolol  - continue to monitor and titrate as necessary

## 2022-03-30 NOTE — PHYSICAL THERAPY NOTE
PHYSICAL THERAPY NOTE          Patient Name: Gamal Galeas  RPRSX'K Date: 3/30/2022        03/30/22 2039   Note Type   Note type Evaluation   Cancel Reasons Medical status     Received order for PT consult  Chart reviewed  Pt with Impacted transcervical fracture at the left proximal femur with varus angulation and external rotation per CT left LE  Order active for orthopedic consult at this time  Will cancel PT services and see patient once medically appropriate and orthopedic recommendations are received      Marianna Marques, PT,DPT

## 2022-03-30 NOTE — H&P (VIEW-ONLY)
Consultation - Orthopedics   Kenji Navas 80 y o  female MRN: [id number]  Unit/Bed#: -01 Encounter: [id number]      Assessment/Plan     Assessment:  Displaced, intracapsular fracture proximal left femur; fracture proximal left humerus; ambulatory dysfunction;  Plan:  The risks, benefits, options and alternatives of treatment have been discussed with the patient and her son, Anyi Goldman  She will require hemiarthroplasty of her left hip for her intracapsular fracture of the proximal left femur  Her left proximal humerus injury will be treated non operatively  Unfortunately, due to the left proximal humerus fracture, she will likely not be able to ambulate very well as she should be nonweightbearing through the left upper extremity for the 1st few weeks  After a thorough discussion, both the patient and Anyi Goldman are agreeable to proceeding with surgery  This will be scheduled for 03/31/2022  History of Present Illness   Physician Requesting Consult: Demetria Post, *  Reason for Consult / Principal Problem:  Fracture proximal left femur; fracture proximal left humerus  HPI: Kenji Navas is a 80y o  year old female who presents with fall at home on 03/29/2022  She denies prodromal symptoms but states she is not really sure what happened  She states she came out of her bathroom, turned and found herself on the floor  She is unsure whether she passed out, tripped or what caused her fall  She was brought via ambulance to the emergency room at [facility], x-rays were obtained demonstrating a fracture of the proximal left humerus and proximal left femur  She has been admitted to the medical service  Orthopedic consultation has been requested  Patient complains of pain the left upper extremity and left lower extremity  Again, she denies any prodromal symptoms prior to the fall      Consults    Review of Systems:  Patient is 10 organ system review was negative excluding the chief complaint and as is consistent with past medical history    Historical Information   Past Medical History:   Diagnosis Date    Hypertension     [facility]roke [facility])      History reviewed  No pertinent surgical history  Social History   Social History     Substance and Sexual Activity   Alcohol Use Not Currently     Social History     Substance and Sexual Activity   Drug Use Not Currently     E-Cigarette/Vaping    E-Cigarette Use Never User      E-Cigarette/Vaping Substances    Nicotine No     THC No     CBD No     Flavoring No     Other No     Unknown No      Social History     Tobacco Use   Smoking Status Never Smoker   Smokeless Tobacco Never Used     Family History: non-contributory    Meds/Allergies   all current active meds have been reviewed  Allergies   Allergen Reactions    Ace Inhibitors Other (See Comments)     unknown    Nitrofurantoin Other (See Comments)     unknown       Objective   Vitals: Blood pressure 138/65, pulse 86, temperature 97 6 °F (36 4 °C), temperature source Temporal, resp  rate 19, height 5' 5" (1 651 m), weight 60 3 kg (132 lb 15 oz), SpO2 97 %  ,Body mass index is 22 12 kg/m²  Intake/Output Summary (Last 24 hours) at 3/30/2022 0945  Last data filed at 3/30/2022 0600  Gross per 24 hour   Intake 445 ml   Output 650 ml   Net -205 ml     I/O last 24 hours: In: 445 [I V :445]  Out: 650 [Urine:650]    Invasive Devices  Report    Peripheral Intravenous Line            Peripheral IV 03/29/22 Right Antecubital <1 day          Drain            Urethral Catheter 18 Fr  <1 day                Physical Exam :  Heriberto Boswell is alert and was oriented to person place and time  She seems to be in minimal distress  HEENT exam is grossly benign  Heart regular, pulses palpable  Lungs clear  Abdomen soft and nontender  Skin without lacerations or abrasions  Motor and sensory exam is grossly intact excluding as would be limited by her injuries    Ortho Exam:  The left lower extremity is shortened, externally rotated and abducted  She has no other E ecchymosis about the left hip, no abrasions or lacerations  Hip range of motion was not attempted at the patient's request   She denies tenderness to palpation of the distal femur, the knee, the lower leg, the ankle and foot  The left upper extremity is in a shoulder immobilizer  She has significant swelling and ecchymosis about the proximal left upper extremity  There is tenderness to palpation  She denies tenderness to palpation more distally in the upper arm, the elbow, the forearm, the wrist and hand  She does complain of some pain during range of motion of the elbow, forearm, wrist and hand but is unable to localize the pain  The right upper extremity exam and right lower extremity examinations are benign  Lab Results:   CBC:   Lab Results   Component Value Date    WBC 10 56 (H) 03/30/2022    HGB 15 2 03/30/2022    HCT 44 7 03/30/2022    MCV 92 03/30/2022     03/30/2022    MCH 31 1 03/30/2022    MCHC 34 0 03/30/2022    RDW 13 8 03/30/2022    MPV 9 5 03/30/2022    NRBC 0 03/30/2022     CMP:   Lab Results   Component Value Date    SODIUM 129 (L) 03/30/2022    CL 90 (L) 03/30/2022    CO2 32 03/30/2022    BUN 13 03/30/2022    CREATININE 0 97 03/30/2022    CALCIUM 8 4 03/30/2022    AST 21 03/30/2022    ALT 15 03/30/2022    ALKPHOS 63 03/30/2022    EGFR 49 03/30/2022     PT/INR: No results found for: PT, INR  Imaging Studies: X-rays of the tibia fibula, femur, pelvis, shoulder, and CT scans of the shoulder and hip area were reviewed demonstrating a displaced, transcervical fracture of the proximal left femur as well as a proximal left humerus fracture in acceptable position, slight varus  EKG, Pathology, and Other Studies:  Radiology reports were reviewed

## 2022-03-30 NOTE — CONSULTS
Consultation - Orthopedics   Luis Daniel Mcginnis 80 y o  female MRN: [id number]  Unit/Bed#: -01 Encounter: [id number]      Assessment/Plan     Assessment:  Displaced, intracapsular fracture proximal left femur; fracture proximal left humerus; ambulatory dysfunction;  Plan:  The risks, benefits, options and alternatives of treatment have been discussed with the patient and her son, Minna Rivers  She will require hemiarthroplasty of her left hip for her intracapsular fracture of the proximal left femur  Her left proximal humerus injury will be treated non operatively  Unfortunately, due to the left proximal humerus fracture, she will likely not be able to ambulate very well as she should be nonweightbearing through the left upper extremity for the 1st few weeks  After a thorough discussion, both the patient and Minna Rivers are agreeable to proceeding with surgery  This will be scheduled for 03/31/2022  History of Present Illness   Physician Requesting Consult: Trudi Nissen [facility], *  Reason for Consult / Principal Problem:  Fracture proximal left femur; fracture proximal left humerus  HPI: Luis Daniel Mcginnis is a 80y o  year old female who presents with fall at home on 03/29/2022  She denies prodromal symptoms but states she is not really sure what happened  She states she came out of her bathroom, turned and found herself on the floor  She is unsure whether she passed out, tripped or what caused her fall  She was brought via ambulance to the emergency room at [facility], x-rays were obtained demonstrating a fracture of the proximal left humerus and proximal left femur  She has been admitted to the medical service  Orthopedic consultation has been requested  Patient complains of pain the left upper extremity and left lower extremity  Again, she denies any prodromal symptoms prior to the fall      Consults    Review of Systems:  Patient is 10 organ system review was negative excluding the chief complaint and as is consistent with past medical history    Historical Information   Past Medical History:   Diagnosis Date    Hypertension     Stroke [facility])      History reviewed  No pertinent surgical history  Social History   Social History     Substance and Sexual Activity   Alcohol Use Not Currently     Social History     Substance and Sexual Activity   Drug Use Not Currently     E-Cigarette/Vaping    E-Cigarette Use Never User      E-Cigarette/Vaping Substances    Nicotine No     THC No     CBD No     Flavoring No     Other No     Unknown No      Social History     Tobacco Use   Smoking Status Never Smoker   Smokeless Tobacco Never Used     Family History: non-contributory    Meds/Allergies   all current active meds have been reviewed  Allergies   Allergen Reactions    Ace Inhibitors Other (See Comments)     unknown    Nitrofurantoin Other (See Comments)     unknown       Objective   Vitals: Blood pressure 138/65, pulse 86, temperature 97 6 °F (36 4 °C), temperature source Temporal, resp  rate 19, height 5' 5" (1 651 m), weight 60 3 kg (132 lb 15 oz), SpO2 97 %  ,Body mass index is 22 12 kg/m²  Intake/Output Summary (Last 24 hours) at 3/30/2022 0945  Last data filed at 3/30/2022 0600  Gross per 24 hour   Intake 445 ml   Output 650 ml   Net -205 ml     I/O last 24 hours: In: 445 [I V :445]  Out: 650 [Urine:650]    Invasive Devices  Report    Peripheral Intravenous Line            Peripheral IV 03/29/22 Right Antecubital <1 day          Drain            Urethral Catheter 18 Fr  <1 day                Physical Exam :  Shaylee George is alert and was oriented to person place and time  She seems to be in minimal distress  HEENT exam is grossly benign  Heart regular, pulses palpable  Lungs clear  Abdomen soft and nontender  Skin without lacerations or abrasions  Motor and sensory exam is grossly intact excluding as would be limited by her injuries    Ortho Exam:  The left lower extremity is shortened, externally rotated and abducted  She has no other E ecchymosis about the left hip, no abrasions or lacerations  Hip range of motion was not attempted at the patient's request   She denies tenderness to palpation of the distal femur, the knee, the lower leg, the ankle and foot  The left upper extremity is in a shoulder immobilizer  She has significant swelling and ecchymosis about the proximal left upper extremity  There is tenderness to palpation  She denies tenderness to palpation more distally in the upper arm, the elbow, the forearm, the wrist and hand  She does complain of some pain during range of motion of the elbow, forearm, wrist and hand but is unable to localize the pain  The right upper extremity exam and right lower extremity examinations are benign  Lab Results:   CBC:   Lab Results   Component Value Date    WBC 10 56 (H) 03/30/2022    HGB 15 2 03/30/2022    HCT 44 7 03/30/2022    MCV 92 03/30/2022     03/30/2022    MCH 31 1 03/30/2022    MCHC 34 0 03/30/2022    RDW 13 8 03/30/2022    MPV 9 5 03/30/2022    NRBC 0 03/30/2022     CMP:   Lab Results   Component Value Date    SODIUM 129 (L) 03/30/2022    CL 90 (L) 03/30/2022    CO2 32 03/30/2022    BUN 13 03/30/2022    CREATININE 0 97 03/30/2022    CALCIUM 8 4 03/30/2022    AST 21 03/30/2022    ALT 15 03/30/2022    ALKPHOS 63 03/30/2022    EGFR 49 03/30/2022     PT/INR: No results found for: PT, INR  Imaging Studies: X-rays of the tibia fibula, femur, pelvis, shoulder, and CT scans of the shoulder and hip area were reviewed demonstrating a displaced, transcervical fracture of the proximal left femur as well as a proximal left humerus fracture in acceptable position, slight varus  EKG, Pathology, and Other Studies:  Radiology reports were reviewed

## 2022-03-30 NOTE — ASSESSMENT & PLAN NOTE
- EKG shows controlled Afib  - Continue rate control with digoxin, metoprolol  - holding home Eliquis in anticipation for surgery

## 2022-03-30 NOTE — ASSESSMENT & PLAN NOTE
· EKG shows controlled Afib  · Continue rate control with digoxin, metoprolol  · Check digoxin level

## 2022-03-30 NOTE — ASSESSMENT & PLAN NOTE
· Patient presents from home status post fall, patient reports use leaving her bathroom and lost her balance falling onto left side  Patient reports positive head strike, currently on eliquis  · Trauma evaluation completed in ED  · CT head and spine negative for acute process  · CT a/p showing fractures of left femur, left humerus   No other acute process  · CT left lower extremity confirms left proximal femur fx  · Hgb stable   · ED provider spoke to orthopedics on-call, will see in consult for evaluation  · Othro consult appreciated  · NPO after midnight  · Hold Eliquis per ortho  · Gentle IV fluid hydration  · P r n   Pain control  · Non weight bearing, maintain haile   · PT/OT/CM consult post-op

## 2022-03-30 NOTE — ASSESSMENT & PLAN NOTE
· Troponin 11, 32, 70  · EKG on admission shows Afib, artifact present but does not appear to have ischemic changes  · No anginal complaints  · Suspect due to acute trauma, demand

## 2022-03-30 NOTE — ASSESSMENT & PLAN NOTE
- Troponin 11, 32, 70  - no ischemic changes on EKG and patient without complaint  - this is non MI related elevation in troponin, no further workup indicated at this time

## 2022-03-30 NOTE — OCCUPATIONAL THERAPY NOTE
Occupational Therapy Cancellation Note         Patient Name: Luis Daniel Mcginnis  TQLFD'L Date: 3/30/2022  Problem List  Principal Problem:    Closed comminuted intertrochanteric fracture of proximal femur, left, initial encounter [facility])  Active Problems:    Closed comminuted fracture of left humerus    Hypertensive urgency    Atrial fibrillation (HCC)    Elevated troponin    Hyponatremia          03/30/22 2627   Note Type   Note type Evaluation   Cancel Reasons Medical status       OT orders received  Chart review completed  Pt admitted to [address] [place] [address] 3/29/2022 with Dx: closed comminuted intertrochanteric fracture of L proximal femur  Pt awaiting Ortho consult at this time  Will hold OT services and re-assess post ortho assessment and surgical intervention if required         Sherryle Pimple, OTR/VIBHA

## 2022-03-30 NOTE — PROGRESS NOTES
[address]  Progress Note - Felipe Ibarra 2/19/1925, 80 y o  female MRN: [id number]  Unit/Bed#: -01 Encounter: [id number]  Primary Care Provider: Simone Adair MD   Date and time admitted to hospital: 3/29/2022  6:45 PM    * Transcervical fracture of left femur, closed, initial encounter [facility])  Assessment & Plan  - status post mechanical fall and subsequent left femur fracture  - will need hemiarthroplasty of her left hip for her intracapsular fracture     - NPO after midnight  - continue to hold Eliquis      Closed comminuted fracture of left humerus  Assessment & Plan  - status post mechanical fall with trauma to the left side  - CT left upper extremity confirms left humerus fracture  - nonweightbearing status and sling per Orthopedic surgery  No surgical intervention required  Hyponatremia  Assessment & Plan  - mild hyponatremia on admission, suspect secondary to decreased p o  Intake  - improving without intervention  - for now will monitor only with daily BMP    Elevated troponin  Assessment & Plan  - Troponin 11, 32, 70  - no ischemic changes on EKG and patient without complaint  - this is non MI related elevation in troponin, no further workup indicated at this time      Atrial fibrillation ([address] )  Assessment & Plan  - EKG shows controlled Afib  - Continue rate control with digoxin, metoprolol  - holding home Eliquis in anticipation for surgery    Hypertensive urgency  Assessment & Plan  - blood pressure much better control now back on home Norvasc and metoprolol  - continue to monitor and titrate as necessary        VTE Pharmacologic Prophylaxis: VTE Score: 10 High Risk (Score >/= 5) - Pharmacological DVT Prophylaxis Ordered: apixaban (Eliquis)  Sequential Compression Devices Ordered  Patient Centered Rounds: I performed bedside rounds with nursing staff today  Discussions with Specialists or Other Care Team Provider: CH[facility]  Ortho      Education and Discussions with Family / Patient: Patient declined call to   Time Spent for Care: 30 minutes  More than 50% of total time spent on counseling and coordination of care as described above  Current Length of Stay: 1 day(s)  Current Patient Status: Inpatient   Certification Statement: The patient will continue to require additional inpatient hospital stay due to femoral fracture and surgical intervention  Discharge Plan: Anticipate discharge in 48-72 hrs to rehab facility  Code Status: Level 3 - DNAR and DNI    Subjective:   Patient seen examined  Following up for femoral neck fracture and humeral fracture  Overall patient doing well today  No events reported overnight  Patient is afebrile  She really has no complaints  Denies any chest pain or shortness of breath  Objective:     Vitals:   Temp (24hrs), Av 9 °F (36 6 °C), Min:97 5 °F (36 4 °C), Max:98 4 °F (36 9 °C)    Temp:  [97 5 °F (36 4 °C)-98 4 °F (36 9 °C)] 97 6 °F (36 4 °C)  HR:  [62-93] 86  Resp:  [15-20] 19  BP: (120-203)/(58-92) 138/65  SpO2:  [85 %-98 %] 97 %  Body mass index is 22 12 kg/m²  Input and Output Summary (last 24 hours): Intake/Output Summary (Last 24 hours) at 3/30/2022 1112  Last data filed at 3/30/2022 0600  Gross per 24 hour   Intake 445 ml   Output 650 ml   Net -205 ml       PHYSICAL EXAM:    Vitals signs reviewed  Constitutional   Awake and cooperative  NAD  Head/Neck   Normocephalic  Atraumatic  HEENT   No scleral icterus  EOMI  Heart   Regular rate and rhythm  No murmers  Lungs   Clear to auscultation bilaterally  Respirations unlaboured  Abdomen   Soft  Nontender  Nondistended  Skin   Skin color normal  No rashes  Extremities   No deformities  Mild left upper extremity edema noted, left upper extremity in sling  Left lower extremity exteriorly rotated  Neuro   Alert and oriented  No new deficits  Psych   Mood stable   Affect normal          Additional Data:     Labs:  Results from last 7 days   Lab Units 03/30/22  0555   WBC Thousand/uL 10 56*   HEMOGLOBIN g/dL 15 2   HEMATOCRIT % 44 7   PLATELETS Thousands/uL 161   NEUTROS PCT % 79*   LYMPHS PCT % 13*   MONOS PCT % 6   EOS PCT % 1     Results from last 7 days   Lab Units 03/30/22  0555   SODIUM mmol/L 129*   POTASSIUM mmol/L 4 0   CHLORIDE mmol/L 90*   CO2 mmol/L 32   BUN mg/dL 13   CREATININE mg/dL 0 97   ANION GAP mmol/L 7   CALCIUM mg/dL 8 4   ALBUMIN g/dL 3 1*   TOTAL BILIRUBIN mg/dL 2 13*   ALK PHOS U/L 63   ALT U/L 15   AST U/L 21   GLUCOSE RANDOM mg/dL 150*                       Lines/Drains:  Invasive Devices  Report    Peripheral Intravenous Line            Peripheral IV 03/29/22 Right Antecubital <1 day          Drain            Urethral Catheter 18 Fr  <1 day              Urinary Catheter:  Goal for removal: Voiding trial when ambulation improves               Imaging: Reviewed radiology reports from this admission including: CT head and xray(s)    Recent Cultures (last 7 days):         Last 24 Hours Medication List:   Current Facility-Administered Medications   Medication Dose Route Frequency Provider Last Rate    acetaminophen  650 mg Oral Q6H PRN Niall Brush, CRNP      amLODIPine  5 mg Oral Daily Niall Brush, CRNP      atorvastatin  20 mg Oral Daily Niall Brush, CRNP      digoxin  125 mcg Oral Daily Niall Brush, CRNP      heparin (porcine)  5,000 Units Subcutaneous [facility] Niall Brush, CRNP      metoprolol succinate  100 mg Oral Daily Niall Paisley, [address]      morphine injection  2 mg Intravenous Q4H PRN Niall Brush, CRNP      naloxone  0 04 mg Intravenous Q1MIN PRN Niall Brush, CRNP      ondansetron  4 mg Intravenous Q6H PRN Niall Brush, CRNP      oxyCODONE  2 5 mg Oral Q6H PRN Niall Brush, CRNP      polyethylene glycol  17 g Oral Daily PRN Niall Brush, CRNP      senna  1 tablet Oral HS PRN Niall Brush, CRNP      sodium chloride  75 mL/hr Intravenous Continuous Niall Brush, CRNP 75 mL/hr (03/30/22 0004)        Today, Patient Was Seen By: Pricila Post DO    **Please Note: This note may have been constructed using a voice recognition system  **

## 2022-03-30 NOTE — ASSESSMENT & PLAN NOTE
- status post mechanical fall and subsequent left femur fracture  - will need hemiarthroplasty of her left hip for her intracapsular fracture     - NPO after midnight  - continue to hold Eliquis

## 2022-03-30 NOTE — ASSESSMENT & PLAN NOTE
- status post mechanical fall with trauma to the left side  - CT left upper extremity confirms left humerus fracture  - nonweightbearing status and sling per Orthopedic surgery  No surgical intervention required

## 2022-03-30 NOTE — H&P
[address]  H&P- Darwin Reyna 2/19/1925, 80 y o  female MRN: [id number]  Unit/Bed#: -01 Encounter: [id number]  Primary Care Provider: Phani Mcmullen MD   Date and time admitted to hospital: 3/29/2022  6:45 PM    * Closed comminuted intertrochanteric fracture of proximal femur, left, initial encounter [facility])  Assessment & Plan  · Patient presents from home status post fall, patient reports use leaving her bathroom and lost her balance falling onto left side  Patient reports positive head strike, currently on eliquis  · Trauma evaluation completed in ED  · CT head and spine negative for acute process  · CT a/p showing fractures of left femur, left humerus   No other acute process  · CT left lower extremity confirms left proximal femur fx  · Hgb stable   · ED provider spoke to orthopedics on-call, will see in consult for evaluation  · Othro consult appreciated  · NPO after midnight  · Hold Eliquis  · Gentle IV fluid hydration  · P r n  Pain control  · Non weight bearing, maintain haile   · PT/OT/CM consult post-op    Elevated troponin  Assessment & Plan  · Troponin 11, 32, 70  · EKG on admission shows Afib, artifact present but does not appear to have ischemic changes  · No anginal complaints  · Suspect due to acute trauma, demand    Atrial fibrillation (HCC)  Assessment & Plan  · EKG shows controlled Afib  · Continue rate control with digoxin, metoprolol  · Check digoxin level    Hypertensive urgency  Assessment & Plan  · BP initially 203/92, suspect acute pain contributing     · Per report from family, patient hasn't been taking blood pressure meds  · Continue Norvasc, Metoprolol   · Continue pain control    Closed comminuted fracture of left humerus  Assessment & Plan  · S/p fall on left side  · CT left upper extremity confirms left humerus fracture  · Left arm with swelling noted  · Consult placed to orthopedics  · Maintain sling to left arm   · PRN pain control     VTE Pharmacologic Prophylaxis: VTE Score: 10 Moderate Risk (Score 3-4) - Pharmacological DVT Prophylaxis Contraindicated  Sequential Compression Devices Ordered  holding eliquis   Code Status: Level 3 - DNAR and DNI   Discussion with family: Patient declined call to   Anticipated Length of Stay: Patient will be admitted on an inpatient basis with an anticipated length of stay of greater than 2 midnights secondary to femur and humerus fx-or   Total Time for Visit, including Counseling / Coordination of Care: 70 minutes Greater than 50% of this total time spent on direct patient counseling and coordination of care  Chief Complaint: fall , hip and left arm pain     History of Present Illness:  Amina Kumari is a 80 y o  female with a PMH of atrial fibrillation on Eliquis, hypertension who presents from home status post fall which patient reports she was coming out of the bathroom and lost her balance and fell onto her left side  Reports positive head strike, is on Eliquis  Patient reports that happened so fast she was not sure if she was dizzy or loss consciousness  Reports son was downstairs and was able to call EMS  Reports acute pain in left arm and left leg with movement  Denies headache, dizziness, chest pain, abdominal pain  Review of Systems:  Review of Systems   Constitutional: Positive for activity change  Negative for chills, fatigue and fever  Respiratory: Negative for cough and shortness of breath  Cardiovascular: Negative for chest pain, palpitations and leg swelling  Gastrointestinal: Negative for abdominal pain, constipation, diarrhea, nausea and vomiting  Genitourinary: Negative for difficulty urinating  Musculoskeletal: Positive for arthralgias, gait problem and joint swelling  Skin: Negative for color change  Neurological: Negative for dizziness, weakness, light-headedness, numbness and headaches         Past Medical and Surgical History:   Past Medical History:   Diagnosis Date    Hypertension     Stroke [facility])        History reviewed  No pertinent surgical history  Meds/Allergies:  Prior to Admission medications    Medication Sig Start Date End Date Taking? Authorizing Provider   amLODIPine (NORVASC) 5 mg tablet Take 5 mg by mouth daily   Yes Historical Provider, MD   apixaban (Eliquis) 2 5 mg Take by mouth   Yes Historical Provider, MD   atorvastatin (LIPITOR) 20 mg tablet Take 20 mg by mouth daily   Yes Historical Provider, MD   digoxin (LANOXIN) 0 125 mg tablet Take 125 mcg by mouth daily   Yes Historical Provider, MD   metoprolol succinate (TOPROL-XL) 100 mg 24 hr tablet Take 100 mg by mouth daily   Yes Historical Provider, MD   triamterene-hydrochlorothiazide (DYAZIDE) 37 5-25 mg per capsule Take 1 capsule by mouth every morning PT takes on Monday and Thursday   Yes Historical Provider, MD     I have reviewed home medications using recent Epic encounter  Allergies: Allergies   Allergen Reactions    Ace Inhibitors Other (See Comments)     unknown    Nitrofurantoin Other (See Comments)     unknown       Social History:  Marital Status: Unknown   Patient Pre-hospital Living Situation: Home, With other family member: son  Patient Pre-hospital Level of Mobility: walks with person assist  Patient Pre-hospital Diet Restrictions: none  Substance Use History:   Social History     Substance and Sexual Activity   Alcohol Use Not Currently     Social History     Tobacco Use   Smoking Status Never Smoker   Smokeless Tobacco Never Used     Social History     Substance and Sexual Activity   Drug Use Not Currently       Family History:  History reviewed  No pertinent family history      Physical Exam:     Vitals:   Blood Pressure: 120/58 (03/29/22 2300)  Pulse: 89 (03/29/22 2300)  Temperature: 97 5 °F (36 4 °C) (03/29/22 2300)  Temp Source: Temporal (03/29/22 2300)  Respirations: 18 (03/29/22 2300)  Height: 5' 5" (165 1 cm) (03/29/22 2154)  Weight - Scale: 60 3 kg (132 lb 15 oz) (03/29/22 2154)  SpO2: 95 % (03/29/22 2300)    Physical Exam  Vitals and nursing note reviewed  Constitutional:       Appearance: Normal appearance  HENT:      Head: Normocephalic and atraumatic  Eyes:      Extraocular Movements: Extraocular movements intact  Pupils: Pupils are equal, round, and reactive to light  Cardiovascular:      Rate and Rhythm: Normal rate  Rhythm irregular  Pulses: Normal pulses  Heart sounds: Normal heart sounds  Pulmonary:      Effort: Pulmonary effort is normal  No respiratory distress  Breath sounds: No wheezing, rhonchi or rales  Abdominal:      General: Bowel sounds are normal  There is no distension  Palpations: Abdomen is soft  Tenderness: There is no abdominal tenderness  Musculoskeletal:         General: Swelling, tenderness, deformity and signs of injury present  Cervical back: Normal range of motion and neck supple  Left lower leg: Edema present  Comments: Able to move left finger, left toes  Warm to touch  Swelling left arm and left leg    Skin:     General: Skin is warm and dry  Neurological:      General: No focal deficit present  Mental Status: She is alert  Comments: Disoriented to month and year            Additional Data:     Lab Results:  Results from last 7 days   Lab Units 03/30/22  0555   WBC Thousand/uL 10 56*   HEMOGLOBIN g/dL 15 2   HEMATOCRIT % 44 7   PLATELETS Thousands/uL 161   NEUTROS PCT % 79*   LYMPHS PCT % 13*   MONOS PCT % 6   EOS PCT % 1     Results from last 7 days   Lab Units 03/29/22  1901   SODIUM mmol/L 127*   POTASSIUM mmol/L 3 8   CHLORIDE mmol/L 89*   CO2 mmol/L 32   BUN mg/dL 13   CREATININE mg/dL 0 94   ANION GAP mmol/L 6   CALCIUM mg/dL 9 2   ALBUMIN g/dL 4 2   TOTAL BILIRUBIN mg/dL 1 71*   ALK PHOS U/L 94   ALT U/L 18   AST U/L 29   GLUCOSE RANDOM mg/dL 132                       Imaging: Reviewed radiology reports from this admission including: chest CT scan, abdominal/pelvic CT and CT head  CT upper extremity wo contrast left   Final Result by Jeff Carlson MD (03/29 2210)      Comminuted impacted fracture at the surgical neck of the left humerus         Workstation performed: [id number]         CT lower extremity wo contrast left   Final Result by Jeff Carlson MD (03/29 2206)      Impacted transcervical fracture at the left proximal femur with varus angulation and external rotation         Workstation performed: [id number]         TRAUMA - CT head wo contrast   Final Result by Jamaica Ortiz DO (03/29 2023)      No intracranial hemorrhage or calvarial fracture  I personally discussed this study with Padmini Almodovar on 3/29/2022 at 8:06 PM             Workstation performed: [id number]         TRAUMA - CT spine cervical wo contrast   Final Result by Jamaica Ortiz DO (03/29 2023)      No cervical spine fracture or traumatic malalignment  I personally discussed this study with Padmini Almodovar on 3/29/2022 at 8:06 PM             Workstation performed: [id number]         TRAUMA - CT chest abdomen pelvis w contrast   Final Result by Jamaica Ortiz DO (03/29 2007)      Comminuted impacted left humeral neck fracture  Impacted subcapital femoral neck fracture  Nonacute findings, as described  I personally discussed this study with Padmini Almodovar on 3/29/2022 at 8:06 PM                   Workstation performed: [id number]         XR shoulder 2+ views LEFT   ED Interpretation by Frank Joseph PA-C (03/29 2000)   Left humeral fracture      XR Trauma chest portable    (Results Pending)   XR Trauma pelvis ap only 1 or 2 vw    (Results Pending)   XR femur 2 views LEFT    (Results Pending)       EKG and Other Studies Reviewed on Admission:   · EKG: Atrial fibrillation  HR 70-80s  ** Please Note: This note has been constructed using a voice recognition system   **

## 2022-03-31 ENCOUNTER — APPOINTMENT (INPATIENT)
Dept: RADIOLOGY | Facility: HOSPITAL | Age: 87
DRG: 522 | End: 2022-03-31
Payer: MEDICARE

## 2022-03-31 ENCOUNTER — ANESTHESIA (INPATIENT)
Dept: PERIOP | Facility: HOSPITAL | Age: 87
DRG: 522 | End: 2022-03-31
Payer: MEDICARE

## 2022-03-31 LAB
ANION GAP SERPL CALCULATED.3IONS-SCNC: 5 MMOL/L (ref 4–13)
ATRIAL RATE: 104 BPM
BUN SERPL-MCNC: 17 MG/DL (ref 5–25)
CALCIUM SERPL-MCNC: 8.2 MG/DL (ref 8.3–10.1)
CHLORIDE SERPL-SCNC: 93 MMOL/L (ref 100–108)
CO2 SERPL-SCNC: 31 MMOL/L (ref 21–32)
CREAT SERPL-MCNC: 0.8 MG/DL (ref 0.6–1.3)
ERYTHROCYTE [DISTWIDTH] IN BLOOD BY AUTOMATED COUNT: 14.1 % (ref 11.6–15.1)
GFR SERPL CREATININE-BSD FRML MDRD: 61 ML/MIN/1.73SQ M
GLUCOSE SERPL-MCNC: 128 MG/DL (ref 65–140)
HCT VFR BLD AUTO: 41.6 % (ref 34.8–46.1)
HGB BLD-MCNC: 14 G/DL (ref 11.5–15.4)
MCH RBC QN AUTO: 30.9 PG (ref 26.8–34.3)
MCHC RBC AUTO-ENTMCNC: 33.7 G/DL (ref 31.4–37.4)
MCV RBC AUTO: 92 FL (ref 82–98)
PLATELET # BLD AUTO: 143 THOUSANDS/UL (ref 149–390)
PMV BLD AUTO: 9.9 FL (ref 8.9–12.7)
POTASSIUM SERPL-SCNC: 3.9 MMOL/L (ref 3.5–5.3)
QRS AXIS: -83 DEGREES
QRSD INTERVAL: 82 MS
QT INTERVAL: 378 MS
QTC INTERVAL: 435 MS
RBC # BLD AUTO: 4.53 MILLION/UL (ref 3.81–5.12)
SODIUM SERPL-SCNC: 129 MMOL/L (ref 136–145)
T WAVE AXIS: 138 DEGREES
VENTRICULAR RATE: 80 BPM
WBC # BLD AUTO: 11.01 THOUSAND/UL (ref 4.31–10.16)

## 2022-03-31 PROCEDURE — C1776 JOINT DEVICE (IMPLANTABLE): HCPCS | Performed by: ORTHOPAEDIC SURGERY

## 2022-03-31 PROCEDURE — C1713 ANCHOR/SCREW BN/BN,TIS/BN: HCPCS | Performed by: ORTHOPAEDIC SURGERY

## 2022-03-31 PROCEDURE — 27236 TREAT THIGH FRACTURE: CPT | Performed by: ORTHOPAEDIC SURGERY

## 2022-03-31 PROCEDURE — 80048 BASIC METABOLIC PNL TOTAL CA: CPT | Performed by: INTERNAL MEDICINE

## 2022-03-31 PROCEDURE — 99232 SBSQ HOSP IP/OBS MODERATE 35: CPT | Performed by: INTERNAL MEDICINE

## 2022-03-31 PROCEDURE — 73502 X-RAY EXAM HIP UNI 2-3 VIEWS: CPT

## 2022-03-31 PROCEDURE — 0SRS0J9 REPLACEMENT OF LEFT HIP JOINT, FEMORAL SURFACE WITH SYNTHETIC SUBSTITUTE, CEMENTED, OPEN APPROACH: ICD-10-PCS | Performed by: ORTHOPAEDIC SURGERY

## 2022-03-31 PROCEDURE — 93010 ELECTROCARDIOGRAM REPORT: CPT | Performed by: STUDENT IN AN ORGANIZED HEALTH CARE EDUCATION/TRAINING PROGRAM

## 2022-03-31 PROCEDURE — G9197 ORDER FOR CEPH: HCPCS | Performed by: ORTHOPAEDIC SURGERY

## 2022-03-31 PROCEDURE — 85027 COMPLETE CBC AUTOMATED: CPT | Performed by: INTERNAL MEDICINE

## 2022-03-31 DEVICE — PREP IM ENCHANCED TOTAL HIP BONE                                    PREPARATION KIT
Type: IMPLANTABLE DEVICE | Site: HIP | Status: FUNCTIONAL
Brand: PREP-IM

## 2022-03-31 DEVICE — SMARTSET HIGH PERFORMANCE MV MEDIUM VISCOSITY BONE CEMENT 40G
Type: IMPLANTABLE DEVICE | Site: HIP | Status: FUNCTIONAL
Brand: SMARTSET

## 2022-03-31 DEVICE — SELF CENTERING BI-POLAR HEAD 28MM ID 54MM OD
Type: IMPLANTABLE DEVICE | Site: HIP | Status: FUNCTIONAL
Brand: SELF CENTERING

## 2022-03-31 DEVICE — CEMENTRALIZER STEM CENTRALIZER 10.5MM CEMENTED
Type: IMPLANTABLE DEVICE | Site: HIP | Status: FUNCTIONAL
Brand: CEMENTRALIZER

## 2022-03-31 DEVICE — ARTICUL/EZE FEMORAL HEAD DIAMETER 28MM +1.5 12/14 TAPER
Type: IMPLANTABLE DEVICE | Site: HIP | Status: FUNCTIONAL
Brand: ARTICUL/EZE

## 2022-03-31 DEVICE — SUMMIT FEMORAL STEM 12/14 TAPER CEMENTED SIZE 7 STD 133MM
Type: IMPLANTABLE DEVICE | Site: HIP | Status: FUNCTIONAL
Brand: SUMMIT

## 2022-03-31 RX ORDER — FENTANYL CITRATE 50 UG/ML
INJECTION, SOLUTION INTRAMUSCULAR; INTRAVENOUS AS NEEDED
Status: DISCONTINUED | OUTPATIENT
Start: 2022-03-31 | End: 2022-03-31

## 2022-03-31 RX ORDER — SODIUM CHLORIDE, SODIUM LACTATE, POTASSIUM CHLORIDE, CALCIUM CHLORIDE 600; 310; 30; 20 MG/100ML; MG/100ML; MG/100ML; MG/100ML
125 INJECTION, SOLUTION INTRAVENOUS CONTINUOUS
Status: DISCONTINUED | OUTPATIENT
Start: 2022-03-31 | End: 2022-03-31

## 2022-03-31 RX ORDER — HYDROMORPHONE HCL/PF 1 MG/ML
0.25 SYRINGE (ML) INJECTION
Status: DISCONTINUED | OUTPATIENT
Start: 2022-03-31 | End: 2022-03-31 | Stop reason: HOSPADM

## 2022-03-31 RX ORDER — DEXAMETHASONE SODIUM PHOSPHATE 10 MG/ML
INJECTION, SOLUTION INTRAMUSCULAR; INTRAVENOUS AS NEEDED
Status: DISCONTINUED | OUTPATIENT
Start: 2022-03-31 | End: 2022-03-31

## 2022-03-31 RX ORDER — FENTANYL CITRATE/PF 50 MCG/ML
25 SYRINGE (ML) INJECTION
Status: DISCONTINUED | OUTPATIENT
Start: 2022-03-31 | End: 2022-03-31 | Stop reason: HOSPADM

## 2022-03-31 RX ORDER — BUPIVACAINE HYDROCHLORIDE AND EPINEPHRINE 2.5; 5 MG/ML; UG/ML
INJECTION, SOLUTION EPIDURAL; INFILTRATION; INTRACAUDAL; PERINEURAL AS NEEDED
Status: DISCONTINUED | OUTPATIENT
Start: 2022-03-31 | End: 2022-03-31 | Stop reason: HOSPADM

## 2022-03-31 RX ORDER — GLYCOPYRROLATE 0.2 MG/ML
INJECTION INTRAMUSCULAR; INTRAVENOUS AS NEEDED
Status: DISCONTINUED | OUTPATIENT
Start: 2022-03-31 | End: 2022-03-31

## 2022-03-31 RX ORDER — SODIUM CHLORIDE, SODIUM LACTATE, POTASSIUM CHLORIDE, CALCIUM CHLORIDE 600; 310; 30; 20 MG/100ML; MG/100ML; MG/100ML; MG/100ML
125 INJECTION, SOLUTION INTRAVENOUS CONTINUOUS
Status: DISCONTINUED | OUTPATIENT
Start: 2022-03-31 | End: 2022-04-04 | Stop reason: HOSPADM

## 2022-03-31 RX ORDER — PROPOFOL 10 MG/ML
INJECTION, EMULSION INTRAVENOUS AS NEEDED
Status: DISCONTINUED | OUTPATIENT
Start: 2022-03-31 | End: 2022-03-31

## 2022-03-31 RX ORDER — CEFAZOLIN SODIUM 2 G/50ML
2000 SOLUTION INTRAVENOUS EVERY 8 HOURS
Status: COMPLETED | OUTPATIENT
Start: 2022-03-31 | End: 2022-04-01

## 2022-03-31 RX ORDER — CEFAZOLIN SODIUM 2 G/50ML
2000 SOLUTION INTRAVENOUS ONCE
Status: COMPLETED | OUTPATIENT
Start: 2022-03-31 | End: 2022-03-31

## 2022-03-31 RX ORDER — NEOSTIGMINE METHYLSULFATE 1 MG/ML
INJECTION INTRAVENOUS AS NEEDED
Status: DISCONTINUED | OUTPATIENT
Start: 2022-03-31 | End: 2022-03-31

## 2022-03-31 RX ORDER — LIDOCAINE HYDROCHLORIDE 10 MG/ML
INJECTION, SOLUTION EPIDURAL; INFILTRATION; INTRACAUDAL; PERINEURAL AS NEEDED
Status: DISCONTINUED | OUTPATIENT
Start: 2022-03-31 | End: 2022-03-31

## 2022-03-31 RX ORDER — ROCURONIUM BROMIDE 10 MG/ML
INJECTION, SOLUTION INTRAVENOUS AS NEEDED
Status: DISCONTINUED | OUTPATIENT
Start: 2022-03-31 | End: 2022-03-31

## 2022-03-31 RX ORDER — ONDANSETRON 2 MG/ML
4 INJECTION INTRAMUSCULAR; INTRAVENOUS ONCE AS NEEDED
Status: DISCONTINUED | OUTPATIENT
Start: 2022-03-31 | End: 2022-03-31 | Stop reason: HOSPADM

## 2022-03-31 RX ORDER — MAGNESIUM HYDROXIDE/ALUMINUM HYDROXICE/SIMETHICONE 120; 1200; 1200 MG/30ML; MG/30ML; MG/30ML
30 SUSPENSION ORAL EVERY 6 HOURS PRN
Status: DISCONTINUED | OUTPATIENT
Start: 2022-03-31 | End: 2022-04-04 | Stop reason: HOSPADM

## 2022-03-31 RX ADMIN — ONDANSETRON 4 MG: 2 INJECTION INTRAMUSCULAR; INTRAVENOUS at 10:37

## 2022-03-31 RX ADMIN — ATORVASTATIN CALCIUM 20 MG: 20 TABLET, FILM COATED ORAL at 17:30

## 2022-03-31 RX ADMIN — SODIUM CHLORIDE, SODIUM LACTATE, POTASSIUM CHLORIDE, AND CALCIUM CHLORIDE: .6; .31; .03; .02 INJECTION, SOLUTION INTRAVENOUS at 10:34

## 2022-03-31 RX ADMIN — FENTANYL CITRATE 25 MCG: 50 INJECTION INTRAMUSCULAR; INTRAVENOUS at 13:15

## 2022-03-31 RX ADMIN — FENTANYL CITRATE 25 MCG: 50 INJECTION INTRAMUSCULAR; INTRAVENOUS at 13:09

## 2022-03-31 RX ADMIN — FENTANYL CITRATE 50 MCG: 50 INJECTION INTRAMUSCULAR; INTRAVENOUS at 11:08

## 2022-03-31 RX ADMIN — SODIUM CHLORIDE 75 ML/HR: 0.9 INJECTION, SOLUTION INTRAVENOUS at 02:24

## 2022-03-31 RX ADMIN — SODIUM CHLORIDE, SODIUM LACTATE, POTASSIUM CHLORIDE, AND CALCIUM CHLORIDE 125 ML/HR: .6; .31; .03; .02 INJECTION, SOLUTION INTRAVENOUS at 14:21

## 2022-03-31 RX ADMIN — PHENYLEPHRINE HYDROCHLORIDE 50 MCG/MIN: 10 INJECTION INTRAVENOUS at 11:23

## 2022-03-31 RX ADMIN — LIDOCAINE HYDROCHLORIDE 50 MG: 10 INJECTION, SOLUTION EPIDURAL; INFILTRATION; INTRACAUDAL at 10:37

## 2022-03-31 RX ADMIN — DEXAMETHASONE SODIUM PHOSPHATE 10 MG: 10 INJECTION INTRAMUSCULAR; INTRAVENOUS at 10:37

## 2022-03-31 RX ADMIN — ROCURONIUM BROMIDE 40 MG: 50 INJECTION, SOLUTION INTRAVENOUS at 10:37

## 2022-03-31 RX ADMIN — CEFAZOLIN SODIUM 2000 MG: 2 SOLUTION INTRAVENOUS at 17:29

## 2022-03-31 RX ADMIN — NEOSTIGMINE METHYLSULFATE 3 MG: 1 INJECTION, SOLUTION INTRAVENOUS at 12:14

## 2022-03-31 RX ADMIN — GLYCOPYRROLATE 0.6 MG: 0.2 INJECTION, SOLUTION INTRAMUSCULAR; INTRAVENOUS at 12:14

## 2022-03-31 RX ADMIN — PROPOFOL 100 MG: 10 INJECTION, EMULSION INTRAVENOUS at 10:37

## 2022-03-31 RX ADMIN — FENTANYL CITRATE 50 MCG: 50 INJECTION INTRAMUSCULAR; INTRAVENOUS at 11:31

## 2022-03-31 RX ADMIN — HEPARIN SODIUM 5000 UNITS: 5000 INJECTION INTRAVENOUS; SUBCUTANEOUS at 06:50

## 2022-03-31 RX ADMIN — SODIUM CHLORIDE, SODIUM LACTATE, POTASSIUM CHLORIDE, AND CALCIUM CHLORIDE 125 ML/HR: .6; .31; .03; .02 INJECTION, SOLUTION INTRAVENOUS at 22:16

## 2022-03-31 RX ADMIN — DIGOXIN 125 MCG: 125 TABLET ORAL at 17:30

## 2022-03-31 RX ADMIN — ROCURONIUM BROMIDE 10 MG: 50 INJECTION, SOLUTION INTRAVENOUS at 11:09

## 2022-03-31 RX ADMIN — ACETAMINOPHEN 325MG 650 MG: 325 TABLET ORAL at 03:55

## 2022-03-31 RX ADMIN — METOPROLOL SUCCINATE 100 MG: 100 TABLET, EXTENDED RELEASE ORAL at 07:40

## 2022-03-31 RX ADMIN — AMLODIPINE BESYLATE 5 MG: 5 TABLET ORAL at 17:30

## 2022-03-31 RX ADMIN — CEFAZOLIN SODIUM 2000 MG: 2 SOLUTION INTRAVENOUS at 10:32

## 2022-03-31 NOTE — OCCUPATIONAL THERAPY NOTE
Occupational Therapy Cancellation Note         Patient Name: Charmaine Ao  RCIVA'T Date: 3/31/2022  Problem List  Principal Problem:    Transcervical fracture of left femur, closed, initial encounter [facility])  Active Problems:    Closed comminuted fracture of left humerus    Hypertensive urgency    Atrial fibrillation (HCC)    Elevated troponin    Hyponatremia          03/31/22 0946   Note Type   Note type Evaluation   Cancel Reasons Patient to operating room       Chart review completed  Pt currently in OR for surgical fixation of L femur  Will hold OT services at this time and re-address post surgery with updated WB status         LISA Vu/L

## 2022-03-31 NOTE — PHYSICAL THERAPY NOTE
PHYSICAL THERAPY NOTE          Patient Name: Geetha Whyte  VDRVN'I Date: 3/31/2022       03/31/22 1030   Note Type   Note type Evaluation   Cancel Reasons Patient to operating room     Patient to OR this date for surgical fixation of L femur fracture  Will cancel this date and see as medically appropriate post-op once new orders are  Received for weight bearing      Teresa Lemus, PT

## 2022-03-31 NOTE — ASSESSMENT & PLAN NOTE
- mild hyponatremia on admission, suspect secondary to decreased p o   Intake  - improving without intervention  - daily BMP

## 2022-03-31 NOTE — DISCHARGE INSTRUCTIONS
Discharge Instructions - Orthopedics  Solis Aldana 80 y o  female MRN: [id number]  Unit/Bed#: APU 07    Weight Bearing Status:                                           Weight Bearing as tolerated to left leg  Nonweightbearing on left arm      DVT prophylaxis:  Eliquis 2 5 mg b i d  Pain:  Continue analgesics as directed    Showering Instructions: You may remove the operative dressing in 72 hours  After this dressing is removed, you may shower and let water run over your incision  Do not submerge the incision for any length of time (bath tubs, pools, hot tubs, etc)       Driving Instructions:  No driving until cleared by Orthopaedic Surgery  PT/OT:  Continue PT/OT on outpatient basis as directed    Appt Instructions: If you do not have your appointment, please call the clinic at [phone] to follow-up with Dr Josue Morejon within 1 week of discharge  Otherwise followup as scheduled  Contact the office sooner if you experience any increased numbness/tingling in the extremities or extreme pain not responding to pain medication  Miscellaneous:    Be sure to maintain Hip Precautions (no bending at waist, no crossing legs, or internally rotating surgical leg) at all times!

## 2022-03-31 NOTE — PROGRESS NOTES
[address]  Progress Note - Carli Lucas 2/19/1925, 80 y o  female MRN: [id number]  Unit/Bed#: -01 Encounter: [id number]  Primary Care Provider: Leo Fabry, MD   Date and time admitted to hospital: 3/29/2022  6:45 PM    * Transcervical fracture of left femur, closed, initial encounter [facility])  Assessment & Plan  - status post mechanical fall and subsequent left femur fracture    - status post left hip hemiarthroplasty with Dr Gary Shoulder on 03/31  - restart home Eliquis tomorrow 4/1 for DVT prophylaxis  - PT/OT eval  - anticipate she will need rehab on discharge        Closed comminuted fracture of left humerus  Assessment & Plan  - status post mechanical fall with trauma to the left side  - CT left upper extremity confirms left humerus fracture  - nonweightbearing status and sling per Orthopedic surgery  No surgical intervention required  Hyponatremia  Assessment & Plan  - mild hyponatremia on admission, suspect secondary to decreased p o  Intake  - improving without intervention  - for now will monitor only with daily BMP    Hypertensive urgency  Assessment & Plan  - blood pressure much better control now back on home Norvasc and metoprolol  - continue to monitor and titrate as necessary    Elevated troponin-resolved as of 3/31/2022  Assessment & Plan  - Troponin 11, 32, 70  - no ischemic changes on EKG and patient without complaint  - this is non MI related elevation in troponin, no further workup indicated at this time        VTE Pharmacologic Prophylaxis: VTE Score: 10 High Risk (Score >/= 5) - Pharmacological DVT Prophylaxis Ordered: apixaban (Eliquis)  Sequential Compression Devices Ordered  Patient Centered Rounds: I performed bedside rounds with nursing staff today  Discussions with Specialists or Other Care Team Provider: CH[facility]AL  Orth[facility]  Education and Discussions with Family / Patient: Patient declined call to   Time Spent for Care: 30 minutes   More than 50% of total time spent on counseling and coordination of care as described above  Current Length of Stay: 2 day(s)  Current Patient Status: Inpatient   Certification Statement: The patient will continue to require additional inpatient hospital stay due to femoral fracture and surgical intervention  Discharge Plan: Anticipate discharge in 24-48 hrs to rehab facility  Code Status: Level 3 - DNAR and DNI    Subjective:   Patient seen examined  Following up for femoral neck fracture and humeral fracture  Patient seen postoperatively  She was little bit lethargic but awake and oriented  Pain is controlled  She really had no complaints  Asking for hot tea  Objective:     Vitals:   Temp (24hrs), Av 8 °F (36 6 °C), Min:97 5 °F (36 4 °C), Max:98 4 °F (36 9 °C)    Temp:  [97 5 °F (36 4 °C)-98 4 °F (36 9 °C)] 97 8 °F (36 6 °C)  HR:  [] 89  Resp:  [14-24] 20  BP: (114-184)/(55-91) 132/61  SpO2:  [87 %-99 %] 97 %  Body mass index is 22 3 kg/m²  Input and Output Summary (last 24 hours): Intake/Output Summary (Last 24 hours) at 3/31/2022 1413  Last data filed at 3/31/2022 1219  Gross per 24 hour   Intake 2540 ml   Output 390 ml   Net 2150 ml       PHYSICAL EXAM:    Vitals signs reviewed  Constitutional   Awake and cooperative  NAD  Head/Neck   Normocephalic  Atraumatic  HEENT   No scleral icterus  EOMI  Heart   Regular rate and rhythm  No murmers  Lungs   Clear to auscultation bilaterally  Respirations unlaboured  Abdomen   Soft  Nontender  Nondistended  Skin   Skin color normal  No rashes  Extremities   No deformities  Mild left upper extremity edema noted, left upper extremity in sling  Left lateral hip bandaged, clean and dry  Neuro   Alert and oriented  No new deficits  Psych   Mood stable   Affect normal          Additional Data:     Labs:  Results from last 7 days   Lab Units 22  0405 22  0555 22  0555   WBC Thousand/uL 11 01*   < > 10 56* HEMOGLOBIN g/dL 14 0   < > 15 2   HEMATOCRIT % 41 6   < > 44 7   PLATELETS Thousands/uL 143*   < > 161   NEUTROS PCT %  --   --  79*   LYMPHS PCT %  --   --  13*   MONOS PCT %  --   --  6   EOS PCT %  --   --  1    < > = values in this interval not displayed  Results from last 7 days   Lab Units 03/31/22  0405 03/30/22  0555 03/30/22  0555   SODIUM mmol/L 129*   < > 129*   POTASSIUM mmol/L 3 9   < > 4 0   CHLORIDE mmol/L 93*   < > 90*   CO2 mmol/L 31   < > 32   BUN mg/dL 17   < > 13   CREATININE mg/dL 0 80   < > 0 97   ANION GAP mmol/L 5   < > 7   CALCIUM mg/dL 8 2*   < > 8 4   ALBUMIN g/dL  --   --  3 1*   TOTAL BILIRUBIN mg/dL  --   --  2 13*   ALK PHOS U/L  --   --  63   ALT U/L  --   --  15   AST U/L  --   --  21   GLUCOSE RANDOM mg/dL 128   < > 150*    < > = values in this interval not displayed  Lines/Drains:  Invasive Devices  Report    Peripheral Intravenous Line            Peripheral IV 03/29/22 Right Antecubital 1 day    Peripheral IV 03/31/22 Right Wrist <1 day          Drain            Urethral Catheter 18 Fr  1 day              Urinary Catheter:  Goal for removal: Voiding trial when ambulation improves               Imaging: No pertinent imaging reviewed      Recent Cultures (last 7 days):         Last 24 Hours Medication List:   Current Facility-Administered Medications   Medication Dose Route Frequency Provider Last Rate    acetaminophen  650 mg Oral Q6H PRN Guevara Jiménez PA-C      aluminum-magnesium hydroxide-simethicone  30 mL Oral Q6H PRN Guevara Jiménez PA-C      amLODIPine  5 mg Oral Daily [place], [place]      [START ON 4/1/2022] apixaban  2 5 mg Oral BID Guevara Jiménez PA-C      atorvastatin  20 mg Oral Daily [place], [place]      cefazolin  2,000 mg Intravenous Q8H [place], [place]      digoxin  125 mcg Oral Daily [place], [place]      lactated ringers  500 mL Intravenous Once PRN Guevara Jiménez PA-C      And    lactated ringers  500 mL Intravenous Once PRN Doneen Saltness, PA-C      lactated ringers  125 mL/hr Intravenous Continuous Doneen Saltness, PA-C      metoprolol succinate  100 mg Oral Daily Doneen Saltness, [place]      morphine injection  2 mg Intravenous Q4H PRN Doneen Saltness, PA-C      naloxone  0 04 mg Intravenous Q1MIN PRN Doneen Saltness, PA-C      ondansetron  4 mg Intravenous Q6H PRN Doneen Saltness, PA-C      oxyCODONE  2 5 mg Oral Q6H PRN Doneen Saltness, PA-C      polyethylene glycol  17 g Oral Daily PRN Doneen Saltness, PA-C      senna  1 tablet Oral HS PRN Doneen Saltness, PA-C      sodium chloride  500 mL Intravenous Once PRN Doneen Saltness, PA-C      And    sodium chloride  500 mL Intravenous Once PRN Doneen Saltness, PA-C          Today, Patient Was Seen By: Aaron Post DO    **Please Note: This note may have been constructed using a voice recognition system  **

## 2022-03-31 NOTE — OP NOTE
OPERATIVE REPORT  PATIENT NAME: Kenji Navas    :  1925  MRN: [id number]  Pt Location:  OR ROOM 02    SURGERY DATE: 3/31/2022    Surgeon(s) and Role:     * Deysi Cruzr - Primary     * Jessica Matamoros PA-C - Assisting    Preop Diagnosis:  Transcervical fracture of left femur, closed, initial encounter ([place] [id number] ) Dileep Ghosh    Post-Op Diagnosis Codes:     * Transcervical fracture of left femur, closed, initial encounter ([place] [address] ) Dileep Ghosh    Procedure(s) (LRB):  HEMIARTHROPLASTY HIP (BIPOLAR) (Left)    Fluids:  500 cc    Estimated Blood Loss:   100 mL    Drains:  Urethral Catheter 18 Fr  (Active)   Reasons to continue Urinary Catheter  Accurate I&O assessment in critically ill patients (48 hr  max) 22   Goal for Removal Voiding trial when ambulation improves 22   Site Assessment Clean;Skin intact 22   [facility] Care Done 22 0900   Collection Container Standard drainage bag 22   Securement Method Securing device (Describe) 22   Output (mL) 260 mL 22 0701   Number of days: 2       Anesthesia Type:   General with supplemental local utilizing 30 cc of 0 25% Marcaine with epinephrine    Operative Indications:  Transcervical fracture of left femur, closed, initial encounter ([place] [address] ) Arielle Flores is a 45-year-old female who fell on 2022 injuring her left hip and left arm  She was seen in the emergency room at [facility] and subsequently admitted with fractures of the proximal left humerus and proximal left femur  She was seen by myself on 2022  The risks, benefits, options and alternatives of treatment were discussed it was elected to proceed with hemiarthroplasty for her left hip and closed treatment of her left proximal humerus  Operative Findings: At the time of the procedure, the left transcervical fracture was noted    A hemiarthroplasty was performed utilizing the size 7 DePuy Almira femoral stem cemented in place with a 54 mm bipolar femoral head with +1 5 mm neck adapter  Good stability was noted with the hip in multiple positions with internal rotation beyond 70°  Leg lengths were approximately equal   The hip could be extended 10°  Complications:   None    Procedure and Technique:  Opal Stanley was taken to the operating room where she was administered a general anesthetic  She was then positioned in the lateral position with care taken to protect neurovascular structures and bony prominences and care taken to protect the left proximal humeral fracture  An axillary roll was position  The patient was secured with the hip auger device  The left lower extremity was then prepped and draped in standard fashion  A time-out was performed and preoperative antibiotics were administered  Tranexamic acid was not utilized due to her history of stroke  A posterolateral approach was performed to the hip with sharp dissection carried down through the skin and subcutaneous tissues  Bleeding was controlled with cautery  Soft tissues were divided down to the fascia which was then divided in line with the incision  A self-retaining retractor was placed  An elevator was placed beneath the capsular minimus and then the external rotators divided at their femoral insertion  Bleeding was controlled with cautery  Capsule was then divided and capsular flaps retracted  The transcervical fracture was noted and the osteotomy performed utilizing the appropriate guide  The femoral head was then extracted from the acetabulum  Femoral head measured 54 mm  The acetabulum was cleansed of debris and a trial reduction with the 54 mm endo femoral head demonstrated good fit and fill  The femoral neck elevator was place retracting the femoral shaft to allow adequate visualization  The femoral canal was accessed 1st with the box osteotome, a canal Finders and then lateralizing Reamer    Sequential reaming was performed to a size 7 and then broaching to a size 7  A trial reduction was performed utilizing the 7 femoral stem, +1 5 mm neck adapter and 54 mm bipolar head  The hip was noted to be stable in a position of sleep with internal rotation beyond 70°, with the hip in adduction, flexion and internal rotation beyond 70°, hip in 90° of flexion with internal rotation beyond 70° and leg lengths were approximately equal   The hip could be extended 10°  Trial components were removed  The femoral canal was irrigated with the pulsatile   Cement was prepared, a cement restrictor was placed and then the cement gun utilized to place the cement within the femoral canal   Once the canal was filled with cement, the femoral stem was impacted into position and excess cement removed  After the cement had cured, soft tissues were checked for any excess cement  None being seen, a trial reduction was performed and once again the +1 5 mm neck and 54 mm bipolar head fit nicely and demonstrated good stability in a wide range of positions  Trial components were removed and the definitive prostheses were impacted into position after the acetabulum and soft tissues were thoroughly irrigated with saline solution  The capsule was sutured  Local anesthetic was used to infiltrate the soft tissues and then the fascia reapproximated with 0  Vicryl  The subcutaneous tissues were approximated with 2-0 Vicryl and the skin secured with skin glue  A Mepilex dressing was applied  An abduction pillow was placed and the patient then placed into the supine position and awakened from the general anesthetic in stable and satisfactory postoperative condition       I was present for the entire procedure, A qualified resident physician was not available and A physician assistant was required during the procedure for retraction tissue handling,dissection and suturing    Patient Disposition:  APU      SIGNATURE: Jorge Marinelli  DATE: March 31, 2022  TIME: 12:32 PM

## 2022-03-31 NOTE — INTERVAL H&P NOTE
H&P reviewed  After examining the patient I find no changes in the patients condition since the H&P had been written      Vitals:    03/31/22 0901   BP: 161/80   Pulse: 102   Resp: 18   Temp: 98 4 °F (36 9 °C)   SpO2: 91%

## 2022-03-31 NOTE — ASSESSMENT & PLAN NOTE
- status post mechanical fall and subsequent left femur fracture    - status post left hip hemiarthroplasty with Dr Nadeem Martin on 03/31  - cont home Eliquis for DVT prophylaxis  - PT/OT eval  - will need rehab on discharge

## 2022-03-31 NOTE — ANESTHESIA POSTPROCEDURE EVALUATION
Post-Op Assessment Note    CV Status:  Stable  Pain scale: does not answer  Pain management: adequate     Mental Status:  Awake (wide eyed)   Hydration Status:  Euvolemic   PONV Controlled:  Controlled   Airway Patency:  Patent   Two or more mitigation strategies used for obstructive sleep apnea   Post Op Vitals Reviewed: Yes      Staff: CRNA         No complications documented      BP   165/72   /Temp   97 6   Pulse  128   Resp   24   SpO2   99

## 2022-04-01 LAB
ANION GAP SERPL CALCULATED.3IONS-SCNC: 3 MMOL/L (ref 4–13)
BUN SERPL-MCNC: 20 MG/DL (ref 5–25)
CALCIUM SERPL-MCNC: 7.9 MG/DL (ref 8.3–10.1)
CHLORIDE SERPL-SCNC: 96 MMOL/L (ref 100–108)
CO2 SERPL-SCNC: 31 MMOL/L (ref 21–32)
CREAT SERPL-MCNC: 0.82 MG/DL (ref 0.6–1.3)
ERYTHROCYTE [DISTWIDTH] IN BLOOD BY AUTOMATED COUNT: 14 % (ref 11.6–15.1)
GFR SERPL CREATININE-BSD FRML MDRD: 60 ML/MIN/1.73SQ M
GLUCOSE SERPL-MCNC: 134 MG/DL (ref 65–140)
HCT VFR BLD AUTO: 33.2 % (ref 34.8–46.1)
HGB BLD-MCNC: 11.6 G/DL (ref 11.5–15.4)
MCH RBC QN AUTO: 31.1 PG (ref 26.8–34.3)
MCHC RBC AUTO-ENTMCNC: 34.9 G/DL (ref 31.4–37.4)
MCV RBC AUTO: 89 FL (ref 82–98)
PLATELET # BLD AUTO: 124 THOUSANDS/UL (ref 149–390)
PMV BLD AUTO: 10.3 FL (ref 8.9–12.7)
POTASSIUM SERPL-SCNC: 4.5 MMOL/L (ref 3.5–5.3)
RBC # BLD AUTO: 3.73 MILLION/UL (ref 3.81–5.12)
SODIUM SERPL-SCNC: 130 MMOL/L (ref 136–145)
WBC # BLD AUTO: 8.9 THOUSAND/UL (ref 4.31–10.16)

## 2022-04-01 PROCEDURE — 97167 OT EVAL HIGH COMPLEX 60 MIN: CPT

## 2022-04-01 PROCEDURE — 80048 BASIC METABOLIC PNL TOTAL CA: CPT | Performed by: ORTHOPAEDIC SURGERY

## 2022-04-01 PROCEDURE — 97530 THERAPEUTIC ACTIVITIES: CPT

## 2022-04-01 PROCEDURE — 99232 SBSQ HOSP IP/OBS MODERATE 35: CPT | Performed by: INTERNAL MEDICINE

## 2022-04-01 PROCEDURE — 99024 POSTOP FOLLOW-UP VISIT: CPT | Performed by: ORTHOPAEDIC SURGERY

## 2022-04-01 PROCEDURE — 97163 PT EVAL HIGH COMPLEX 45 MIN: CPT

## 2022-04-01 PROCEDURE — 85027 COMPLETE CBC AUTOMATED: CPT | Performed by: ORTHOPAEDIC SURGERY

## 2022-04-01 RX ADMIN — SODIUM CHLORIDE, SODIUM LACTATE, POTASSIUM CHLORIDE, AND CALCIUM CHLORIDE 125 ML/HR: .6; .31; .03; .02 INJECTION, SOLUTION INTRAVENOUS at 06:27

## 2022-04-01 RX ADMIN — CEFAZOLIN SODIUM 2000 MG: 2 SOLUTION INTRAVENOUS at 01:15

## 2022-04-01 RX ADMIN — SODIUM CHLORIDE, SODIUM LACTATE, POTASSIUM CHLORIDE, AND CALCIUM CHLORIDE 125 ML/HR: .6; .31; .03; .02 INJECTION, SOLUTION INTRAVENOUS at 15:38

## 2022-04-01 RX ADMIN — ACETAMINOPHEN 325MG 650 MG: 325 TABLET ORAL at 22:08

## 2022-04-01 RX ADMIN — ACETAMINOPHEN 325MG 650 MG: 325 TABLET ORAL at 06:28

## 2022-04-01 RX ADMIN — METOPROLOL SUCCINATE 100 MG: 100 TABLET, EXTENDED RELEASE ORAL at 08:30

## 2022-04-01 RX ADMIN — SODIUM CHLORIDE, SODIUM LACTATE, POTASSIUM CHLORIDE, AND CALCIUM CHLORIDE 125 ML/HR: .6; .31; .03; .02 INJECTION, SOLUTION INTRAVENOUS at 23:47

## 2022-04-01 RX ADMIN — APIXABAN 2.5 MG: 2.5 TABLET, FILM COATED ORAL at 19:58

## 2022-04-01 RX ADMIN — CEFAZOLIN SODIUM 2000 MG: 2 SOLUTION INTRAVENOUS at 10:40

## 2022-04-01 RX ADMIN — ATORVASTATIN CALCIUM 20 MG: 20 TABLET, FILM COATED ORAL at 08:30

## 2022-04-01 RX ADMIN — AMLODIPINE BESYLATE 5 MG: 5 TABLET ORAL at 08:30

## 2022-04-01 RX ADMIN — APIXABAN 2.5 MG: 2.5 TABLET, FILM COATED ORAL at 08:30

## 2022-04-01 RX ADMIN — DIGOXIN 125 MCG: 125 TABLET ORAL at 08:30

## 2022-04-01 RX ADMIN — ACETAMINOPHEN 325MG 650 MG: 325 TABLET ORAL at 15:33

## 2022-04-01 NOTE — PLAN OF CARE
Problem: PHYSICAL THERAPY ADULT  Goal: Performs mobility at highest level of function for planned discharge setting  See evaluation for individualized goals  Description: Treatment/Interventions: Functional transfer training,LE strengthening/ROM,Elevations,Therapeutic exercise,Endurance training,Patient/family training,Equipment eval/education,Bed mobility,Gait training,Compensatory technique education,Spoke to nursing,Spoke to case management,OT  Equipment Recommended:  (TBD by rehab)       See flowsheet documentation for full assessment, interventions and recommendations  Note: Prognosis: Good  Problem List: Decreased strength,Impaired balance,Decreased endurance,Decreased mobility,Decreased cognition,Impaired judgement,Decreased safety awareness,Impaired hearing,Decreased skin integrity,Orthopedic restrictions,Pain  Assessment: Pt is a 80 y o  female seen for PT evaluation s/p admission to [address] [place] [address] on 3/29/2022 with [facility] left [facility]  Order placed for PT services  Upon evaluation: Pt is presenting with impaired functional mobility due to pain, decreased strength, decreased ROM, decreased endurance, impaired balance, gait deviations, decreased safety awareness, impaired judgment, impaired hearing, orthopedic restrictions, fall risk and impaired skin integrity requiring maximal of 2 assistance for bed mobility and maximal of 2 to achieve 3/4 standing and unable to ambulate or spt   Pt's clinical presentation is currently unstable/unpredictable given the functional mobility deficits above, especially weakness, decreased skin integrity, decreased endurance, pain, decreased activity tolerance, decreased functional mobility tolerance, decreased safety awareness, impaired judgement and decreased cognition, coupled with fall risks as indicated by AM-PAC 6-Clicks: 4/29 as well as hx of falls, impaired balance, polypharmacy, impaired judgement and decreased safety awareness and combined with medical complications of pain impacting overall mobility status, abnormal WBCs, abnormal sodium values, fear/retreat and need for input for mobility technique/safety, Left humeral fx, hypertensive urgency on admission, Afib  Pt's PMHx and comorbidities that may affect physical performance and progress include: A fib, HTN and CVA  Personal factors affecting pt at time of IE include: inaccessible home environment, step(s) to enter environment, limited home support, advanced age, inability to perform IADLs, inability to perform ADLs, inability to navigate level surfaces without external assistance, inability to ambulate household distances and recent fall(s)/fall history  Pt will benefit from continued skilled PT services to address deficits as defined above and to maximize level of functional mobility to facilitate return toward PLOF and improved QOL  From PT/mobility standpoint, recommendation at time of d/c would be Short term rehab pending progress in order to reduce fall risk and maximize pt's functional independence and consistency with mobility in order to facilitate return to PLOF  Recommend ther ex next 1-2 sessions  Barriers to Discharge: Decreased caregiver support,Inaccessible home environment  Barriers to Discharge Comments: requires assistance to complete all mobility     PT Discharge Recommendation: Post acute rehabilitation services          See flowsheet documentation for full assessment

## 2022-04-01 NOTE — ANESTHESIA PREPROCEDURE EVALUATION
Procedure:  HEMIARTHROPLASTY HIP (BIPOLAR) (Left Hip)    Relevant Problems   CARDIO   (+) Atrial fibrillation (HCC)   (+) Hypertensive urgency      Other   (+) Closed comminuted fracture of left humerus   (+) Fall at home   (+) Hyponatremia   (+) Transcervical fracture of left femur, closed, initial encounter [facility])        Physical Exam    Airway       Dental   lower dentures and upper dentures,     Cardiovascular      Pulmonary      Other Findings        Anesthesia Plan  ASA Score- 3     Anesthesia Type- general with ASA Monitors  Additional Monitors:   Airway Plan: ETT  Plan Factors-    Chart reviewed  Imaging results reviewed  Existing labs reviewed  Induction- intravenous  Postoperative Plan- Plan for postoperative opioid use  Planned trial extubation    Informed Consent- Anesthetic plan and risks discussed with patient  I personally reviewed this patient with the CRNA  Discussed and agreed on the Anesthesia Plan with the CRNA  Kodi Cancino

## 2022-04-01 NOTE — CASE MANAGEMENT
Case Management Discharge Planning Note    Patient name Jose Albarado  Location /-85 MRN [id number]  : 1925 Date 2022       Current Admission Date: 3/29/2022  Current Admission Diagnosis:Transcervical fracture of left femur, closed, initial encounter [facility])   Patient Active Problem List    Diagnosis Date Noted    Closed comminuted fracture of left humerus 2022    Fall at home 2022    Transcervical fracture of left femur, closed, initial encounter ([facility] ) 2022    Hyponatremia 2022    Hypertensive urgency     Atrial fibrillation ([facility] )       LOS (days): 3  Geometric Mean LOS (GMLOS) (days): 2 90  Days to GMLOS:0 3     OBJECTIVE:  Risk of Unplanned Readmission Score: 14         Current admission status: Inpatient   Preferred Pharmacy:   [address] [place] [address]  Phone: [phone] Fax: [phone] AID-[id number]  [place] [address]  Phone: [phone] Fax: [phone] AID-[id number], [place], [address]  [address]  [address] [facility] [address]  Phone: [phone] Fax: [phone]    Primary Care Provider: Aixa Will MD    Primary Insurance: MEDICARE  Secondary Insurance: [facility]    DISCHARGE DETAILS:    Discharge planning discussed with[de-identified] patient and daughter-in-law, Ruthie Valdivia of Choice: Yes  Comments - Freedom of Choice: family requests [facility] rehab  CM contacted family/caregiver?: Yes  Were Treatment Team discharge recommendations reviewed with patient/caregiver?: Yes  Did patient/caregiver verbalize understanding of patient care needs?: Yes  Were patient/caregiver advised of the risks associated with not following Treatment Team discharge recommendations?: Yes    Contacts  Patient Contacts: Thekuldip Lapidus, daughter-in-law  Relationship to Patient[de-identified] Family  Contact Method: Phone  Phone Number: see face sheet  Reason/Outcome: Discharge [address]         Is the patient interested in [facility] at discharge?: No    DME Referral Provided  Referral made for DME?: No    Other Referral/Resources/Interventions Provided:  Interventions: Acute Rehab  Referral Comments: [facility] Bot[facility]    Would you like to participate in our [address] service program?  : No - Declined    Treatment Team Recommendation: Acute Rehab  Discharge Destination Plan[de-identified] Acute Rehab      CM met with patient at bedside  CM discussed the role of CM in helping the patient develop a discharge plan and assist the patient in carry out their plan  CM discussed rehab recommendation with patient and patient indicated she was previously in rehab behind [address]  and would like to go there  CM attempted to contact son, Hollis Parker [facility]) with no success  CM contacted Nataly Canales, daughter in law, to discuss recommendation  Nataly Canales indicated she is  for [facility]ea indicated patient was previously at [facility] rehab following stroke and family would request same facility  CM submitted ECIN referral to MUNIR Botello

## 2022-04-01 NOTE — PHYSICAL THERAPY NOTE
PHYSICAL THERAPY EVALUATION  NAME:  Messi Hansen  DATE: 04/01/22    AGE:   80 y o  Mrn:   [id number]  ADMIT DX:  Femoral neck fracture ([address] ) [S72 009A]  Shoulder pain [M25 519]  Injury of head, initial encounter [S09 90XA]  Fx humeral neck, left, closed, initial encounter [S80 718C]    Past Medical History:   Diagnosis Date    Hypertension     Stroke [facility])      Length Of Stay: 3  Performed at least 2 patient identifiers during session: Name and Birthday  PHYSICAL THERAPY EVALUATION :      04/01/22 0910   PT Last Visit   PT Visit Date 04/01/22   Note Type   Note type Evaluation   Pain Assessment   Pain Assessment Tool 0-10   Pain Location/Orientation Orientation: Left; Location: Shoulder; Location: Hip   Pain Rating: FLACC (Rest) - Face 0   Pain Rating: FLACC (Rest) - Legs 0   Pain Rating: FLACC (Rest) - Activity 0   Pain Rating: FLACC (Rest) - Cry 1   Pain Rating: FLACC (Rest) - Consolability 0   Score: FLACC (Rest) 1   Pain Rating: FLACC (Activity) - Face 1   Pain Rating: FLACC (Activity) - Legs 1   Pain Rating: FLACC (Activity) - Activity 0   Pain Rating: FLACC (Activity) - Cry 1   Pain Rating: FLACC (Activity) - Consolability 0   Score: FLACC (Activity) 3   Restrictions/Precautions   Weight Bearing Precautions Per Order Yes   LUE Weight Bearing Per Order NWB   LLE Weight Bearing Per Order WBAT   Other Precautions Chair Alarm; Bed Alarm; Fall Risk;Pain;Multiple lines;Hard of hearing;THR;WBS  (2 lpm start of session)   Home Living   Type of Home House  (2 LILA B HRs)   Home Layout One level;Performs ADLs on one level; Able to live on main level with bedroom/bathroom   Bathroom Shower/Tub   (sponge bethes)   Bathroom Toilet   (unsure)   Additional Comments Questionable historian  Reports living in a 1  with 2 LILA B HRs and not using an AD PTA      Prior Function   Level of [place] Independent with ADLs and functional mobility   Lives With Son  Summer Alves   Cezar Help From Family   ADL Assistance Independent IADLs Needs assistance   Falls in the last 6 months 1 to 4   Comments Reports being independent with mobility, ADls and has assistance for IADLs  General   Additional Pertinent History Pt is s/p L hip hemiarthroplasty on 3/31/22  She has a nonoperative L humeral fx now in sling  Cognition   Orientation Level Oriented to person;Oriented to place;Oriented to situation  (Reports October, then November 2022)   Following Commands Follows one step commands with increased time or repetition   Comments repeated cues  Subjective   Subjective "I am very independent  RLE Assessment   RLE Assessment WFL   LLE Assessment   LLE Assessment X   LLE Overall AROM   L Hip Flexion supine < 90 degrees  minimal seated EOB  (2-/5 strength)   L Hip ABduction WFL  (2/5)   L Knee Flexion WFL  (2+/5)   L Knee Extension 0 degrees  (3-/5)   L Ankle Dorsiflexion WFL   Light Touch   RLE Light Touch Grossly intact   LLE Light Touch Grossly intact   Bed Mobility   Supine to Sit 2  Maximal assistance   Additional items Assist x 2; Increased time required;Verbal cues;LE management;HOB elevated  (trunk management)   Additional Comments HOB elevated < 30 degrees  required maxAx2 to achieve sitting EOB with max verbal cues for technique and sequence and reassurance  Transfers   Sit to Stand   (maxAx2 to 3/4 standing only )   Additional items Assist x 2; Increased time required;Verbal cues   Stand to Sit 2  Maximal assistance   Additional items Assist x 1; Increased time required;Verbal cues   Stand pivot Unable to assess   Additional Comments no AD  HHA R UE  sit to stand with maxAx2 to 3/4 standing with max verbal cues for upright posture and manual cues for increased hip extension  pt with inc posterior lean  unable to achieve full uprihgt posture  attempted spt/squat pivot to [facility] with wt shifting  minimal wt shifting to left to advance R LE with L knee buckling  able to minimally adduct R LE with manual assistance   stand to sit wiht maxAx1 for controlled descent  Ambulation/Elevation   Distance pt unable at this time  maxAx2 for wt shifting with pt L knee buckling during attempt to elevate R LE and minimal advancement of L LE  Balance   Static Sitting Fair   Dynamic Sitting Fair -   Static Standing Poor -   Dynamic Standing Poor -   Endurance Deficit   Endurance Deficit Description Pt on 2 lpm O2 at start of session at 97%  removed O2 and pt maintained in low 90s  Activity Tolerance   Activity Tolerance Patient limited by fatigue   Medical Staff Made Aware OTAdebayo   Nurse Made Aware RNArcadio   Assessment   Prognosis Good   Problem List Decreased strength; Impaired balance;Decreased endurance;Decreased mobility; Decreased cognition; Impaired judgement;Decreased safety awareness; Impaired hearing;Decreased skin integrity;Orthopedic restrictions;Pain   Barriers to Discharge Decreased caregiver support; Inaccessible home environment   Barriers to Discharge Comments requires assistance to complete all mobility   Goals   Patient Goals "Go home"   STG Expiration Date 04/15/22   PT Treatment Day 1   Plan   Treatment/Interventions Functional transfer training;LE strengthening/ROM; Elevations; Therapeutic exercise; Endurance training;Patient/family training;Equipment eval/education; Bed mobility;Gait training; Compensatory technique education;Spoke to nursing;Spoke to case management;OT   PT Frequency 4-6x/wk   Recommendation   PT Discharge Recommendation Post acute rehabilitation services   Equipment Recommended   (TBD by rehab)   Additional Comments educated patient on posterior hip precautions unable to recall and repeat despite max education  posteiror hip precautions maintained throughout      AM-PAC Basic Mobility Inpatient   Turning in Bed Without Bedrails 2   Lying on Back to Sitting on Edge of Flat Bed 1   Moving Bed to Chair 1   Standing Up From Chair 1   Walk in Room 1   Climb 3-5 Stairs 1   Basic Mobility Inpatient Raw Score 7   Turning Head Towards Sound 3   Follow Simple Instructions 2   Low Function Basic Mobility Raw Score 12   Low Function Basic Mobility Standardized Score 18 33   Highest Level Of Mobility   -[facility] Goal 2: Bed activities/Dependent transfer   -[facility] Highest Level of Mobility 4: Move to chair/commode   -[facility] Goal Achieved Yes   Additional Treatment Session   Start Time 0930   End Time 0947   Treatment Assessment Pt tolerated session fairly  She reports fatigue  She was able to achieve standing with pulling from bedrail for support and able to take 1 step with use of bedrail with R UE support  She was able to complete partial squat pivot transfer to recliner with increased assistance and multiple trials  She requires increased rest between activities  She is limited by decreased strength, balance, endurance  She will continue to benefit from PT services to maximize LOF  Equipment Use drop arm reclined chair  completed partial squat pivot transfer to patient's right with maxAx1 and modAx1 for initial scoot/transfer to chair, then maxAx1 and minAx1 to complete 2nd lateral scoot to right to position in chair  facing bedrail  completed sit to stand with maxAx2 achieving full uprihgt standing  wt shifting to left and right with maxAx1 and minAx1  returned to sitting  then facing head of bed with R hand on HR  completed sit<>stand with maxAx2 with manual cues for upright posture  then complete wt shfiting and took 1 step forward with maxAx1 and minAx1 with L knee blocked and manual cues for wt shifting to left for R LE advancement  End of Consult   Patient Position at End of Consult Bed/Chair alarm activated; All needs within reach; Bedside chair     Pt requires PT /OT co-treat and co-eval due to signficant assistance with mobility and cognitive-behavioral impairments  (Please find full objective findings from PT assessment regarding body systems outlined above)       Assessment: Pt is a 80 y o  female seen for PT evaluation s/p admission to [address] [place] [address] on 3/29/2022 with Transcervical fracture of left femur, closed, initial encounter ([facility] [address] )  Order placed for PT services  Upon evaluation: Pt is presenting with impaired functional mobility due to pain, decreased strength, decreased ROM, decreased endurance, impaired balance, gait deviations, decreased safety awareness, impaired judgment, impaired hearing, orthopedic restrictions, fall risk and impaired skin integrity requiring maximal of 2 assistance for bed mobility and maximal of 2 to achieve 3/4 standing and unable to ambulate or spt  Pt's clinical presentation is currently unstable/unpredictable given the functional mobility deficits above, especially weakness, decreased skin integrity, decreased endurance, pain, decreased activity tolerance, decreased functional mobility tolerance, decreased safety awareness, impaired judgement and decreased cognition, coupled with fall risks as indicated by AM-PAC 6-Clicks: 1/69 as well as hx of falls, impaired balance, polypharmacy, impaired judgement and decreased safety awareness and combined with medical complications of pain impacting overall mobility status, abnormal WBCs, abnormal sodium values, fear/retreat and need for input for mobility technique/safety, Left humeral fx, hypertensive urgency on admission, Afib  Pt's PMHx and comorbidities that may affect physical performance and progress include: A fib, HTN and CVA  Personal factors affecting pt at time of IE include: inaccessible home environment, step(s) to enter environment, limited home support, advanced age, inability to perform IADLs, inability to perform ADLs, inability to navigate level surfaces without external assistance, inability to ambulate household distances and recent fall(s)/fall history   Pt will benefit from continued skilled PT services to address deficits as defined above and to maximize level of functional mobility to facilitate return toward PLOF and improved QOL  From PT/mobility standpoint, recommendation at time of d/c would be Short term rehab pending progress in order to reduce fall risk and maximize pt's functional independence and consistency with mobility in order to facilitate return to PLOF  Recommend ther ex next 1-2 sessions  The patient's AM-PAC Basic Mobility Inpatient Short Form Raw Score is 7  A Raw score of less than or equal to 16 suggests the patient may benefit from discharge to post-acute rehabilitation services  Please also refer to the recommendation of the Physical Therapist for safe discharge planning  Goals: Pt will: Perform bed mobility tasks with consistent min A of 1 to reposition in bed and prepare for transfers  Pt will perform transfers with consistent modAx1 to decrease burden of care, decrease risk for falls and improve activity tolerance and prepare for ambulation  Pt will ambulate with LRAD for >/= 15' with  consistent modAx1  to decrease burden of care, decrease risk for falls, improve activity tolerance and improve gait quality and to access home environment  Pt will complete >/= 1 steps with with bilateral handrails with maxAx1 to decrease burden of care, return to home with LILA, decrease risk for falls and improve activity tolerance  Pt will increase B LE strength >/= 1/2 MMT grade to facilitate functional mobility        Jose Patterson, PT,DPT

## 2022-04-01 NOTE — PROGRESS NOTES
[address]  Progress Note - Jazmyne Naas 2/19/1925, 80 y o  female MRN: [id number]  Unit/Bed#: -01 Encounter: [id number]  Primary Care Provider: Willian Hartman MD   Date and time admitted to hospital: 3/29/2022  6:45 PM    * Transcervical fracture of left femur, closed, initial encounter [facility])  Assessment & Plan  - status post mechanical fall and subsequent left femur fracture    - status post left hip hemiarthroplasty with Dr Amador Awan on 03/31  - cont home Eliquis for DVT prophylaxis  - PT/OT eval  - will need rehab on discharge        Closed comminuted fracture of left humerus  Assessment & Plan  - status post mechanical fall with trauma to the left side  - CT left upper extremity confirms left humerus fracture  - nonweightbearing status and sling per Orthopedic surgery  No surgical intervention required  Hyponatremia  Assessment & Plan  - mild hyponatremia on admission, suspect secondary to decreased p o  Intake  - improving without intervention  - daily BMP    Atrial fibrillation (HCC)  Assessment & Plan  - Continue rate control with digoxin, metoprolol  - resume Eliquis for stroke ppx    Hypertensive urgency  Assessment & Plan  - blood pressure much better control now back on home Norvasc and metoprolol  - continue to monitor and titrate as necessary    Elevated troponin-resolved as of 3/31/2022  Assessment & Plan  - Troponin 11, 32, 70  - no ischemic changes on EKG and patient without complaint  - this is non MI related elevation in troponin, no further workup indicated at this time        VTE Pharmacologic Prophylaxis: VTE Score: 10 High Risk (Score >/= 5) - Pharmacological DVT Prophylaxis Ordered: apixaban (Eliquis)  Sequential Compression Devices Ordered  Patient Centered Rounds: I performed bedside rounds with nursing staff today  Discussions with Specialists or Other Care Team Provider: [facility]  Or[facility]      Education and Discussions with Family / Patient: Patient declined call to   Time Spent for Care: 30 minutes  More than 50% of total time spent on counseling and coordination of care as described above  Current Length of Stay: 3 day(s)  Current Patient Status: Inpatient   Certification Statement: The patient will continue to require additional inpatient hospital stay due to femoral fracture and surgical intervention  Discharge Plan: Anticipate discharge in 24-48 hrs to rehab facility  Code Status: Level 3 - DNAR and DNI    Subjective:   Patient seen examined  Following up for femoral neck fracture and humeral fracture  No complaints today  Pain relatively well controlled  Was able to participate with physical therapy and take 1 step  Will need rehab on discharge  Otherwise  Objective:     Vitals:   Temp (24hrs), Av 9 °F (36 6 °C), Min:97 6 °F (36 4 °C), Max:98 2 °F (36 8 °C)    Temp:  [97 6 °F (36 4 °C)-98 2 °F (36 8 °C)] 97 7 °F (36 5 °C)  HR:  [67-89] 81  Resp:  [12-21] 14  BP: (102-132)/(52-66) 117/57  SpO2:  [94 %-100 %] 94 %  Body mass index is 22 3 kg/m²  Input and Output Summary (last 24 hours): Intake/Output Summary (Last 24 hours) at 2022 1335  Last data filed at 2022 1327  Gross per 24 hour   Intake 3004 58 ml   Output 745 ml   Net 2259 58 ml       PHYSICAL EXAM:    Vitals signs reviewed  Constitutional   Awake and cooperative  NAD  Head/Neck   Normocephalic  Atraumatic  HEENT   No scleral icterus  EOMI  Heart   Regular rate and rhythm  No murmers  Lungs   Clear to auscultation bilaterally  Respirations unlaboured  Abdomen   Soft  Nontender  Nondistended  Skin   Skin color normal  No rashes  Extremities   No deformities  Mild left upper extremity edema noted, left upper extremity in sling  Left lateral hip bandaged, clean and dry  Neuro   Alert and oriented  No new deficits  Psych   Mood stable   Affect normal          Additional Data:     Labs:  Results from last 7 days   Lab Units 04/01/22  0507 03/31/22  0405 03/30/22  0555   WBC Thousand/uL 8 90   < > 10 56*   HEMOGLOBIN g/dL 11 6   < > 15 2   HEMATOCRIT % 33 2*   < > 44 7   PLATELETS Thousands/uL 124*   < > 161   NEUTROS PCT %  --   --  79*   LYMPHS PCT %  --   --  13*   MONOS PCT %  --   --  6   EOS PCT %  --   --  1    < > = values in this interval not displayed  Results from last 7 days   Lab Units 04/01/22  0507 03/31/22 0405 03/30/22  0555   SODIUM mmol/L 130*   < > 129*   POTASSIUM mmol/L 4 5   < > 4 0   CHLORIDE mmol/L 96*   < > 90*   CO2 mmol/L 31   < > 32   BUN mg/dL 20   < > 13   CREATININE mg/dL 0 82   < > 0 97   ANION GAP mmol/L 3*   < > 7   CALCIUM mg/dL 7 9*   < > 8 4   ALBUMIN g/dL  --   --  3 1*   TOTAL BILIRUBIN mg/dL  --   --  2 13*   ALK PHOS U/L  --   --  63   ALT U/L  --   --  15   AST U/L  --   --  21   GLUCOSE RANDOM mg/dL 134   < > 150*    < > = values in this interval not displayed  Lines/Drains:  Invasive Devices  Report    Peripheral Intravenous Line            Peripheral IV 03/31/22 Right Wrist 1 day          Drain            Urethral Catheter 18 Fr  2 days              Urinary Catheter:  Goal for removal: Voiding trial when ambulation improves               Imaging: No pertinent imaging reviewed      Recent Cultures (last 7 days):         Last 24 Hours Medication List:   Current Facility-Administered Medications   Medication Dose Route Frequency Provider Last Rate    acetaminophen  650 mg Oral Q6H PRN Porfirio Salcedo PA-C      aluminum-magnesium hydroxide-simethicone  30 mL Oral Q6H PRN Porfirio Salcedo PA-C      amLODIPine  5 mg Oral Daily Porfirio Salcedo PA-C      apixaban  2 5 mg Oral BID Porfirio Salcedo PA-C      atorvastatin  20 mg Oral Daily Carmen Baca      digoxin  125 mcg Oral Daily Carmen Baca      lactated ringers  500 mL Intravenous Once PRN Porfirio Salcedo PA-C      And    lactated ringers  500 mL Intravenous Once PRN Porfirio Salcedo PA-C      lactated ringers  125 mL/hr Intravenous Continuous Leonardo Holloway, PA-C 125 mL/hr (04/01/22 0627)    metoprolol succinate  100 mg Oral Daily Carmen Vaz      morphine injection  2 mg Intravenous Q4H PRN Leonardo Holloway, PA-SHARRI      naloxone  0 04 mg Intravenous Q1MIN PRN Leonardo Holloway, PA-C      ondansetron  4 mg Intravenous Q6H PRN Leonardo Holloway, PA-C      oxyCODONE  2 5 mg Oral Q6H PRN Leonardo Holloway, PA-C      polyethylene glycol  17 g Oral Daily PRN Leonardo Holloway, PA-C      senna  1 tablet Oral HS PRN Leonardo Holloway, PA-C      sodium chloride  500 mL Intravenous Once PRN HUDSON Vaz-C      And    sodium chloride  500 mL Intravenous Once PRN Leonardo Holloway, PA-C          Today, Patient Was Seen By: Ed Post DO    **Please Note: This note may have been constructed using a voice recognition system  **

## 2022-04-01 NOTE — PLAN OF CARE
Problem: OCCUPATIONAL THERAPY ADULT  Goal: Performs self-care activities at highest level of function for planned discharge setting  See evaluation for individualized goals  Description: Treatment Interventions: ADL retraining,Functional transfer training,UE strengthening/ROM,Endurance training,Cognitive reorientation,Patient/family training,Equipment evaluation/education,Neuromuscular reeducation,Compensatory technique education,Continued evaluation,Energy conservation,Activityengagement          See flowsheet documentation for full assessment, interventions and recommendations  Note: Limitation: Decreased ADL status,Decreased UE strength,Decreased Safe judgement during ADL,Decreased cognition,Decreased endurance,Decreased UE ROM,Decreased self-care trans,Decreased high-level ADLs,Non-func L UE  Prognosis: Good  Assessment: Pt is a 80 y o  female, admitted to [address] [place] [address] 3/29/2022 d/t experiencing a fall at home resulting in L shoulder pain and L hip pain  Dx: transcervical fracture of L femur and fracture of L proximal humerus  Pt underwent surgical intervention on 3/31/2022 - HEMIARTHROPLASTY HIP (BIPOLAR) (Left)  Pt is currently WBAT LLE with posterior hip precautions  LUE NWB in sling  Pt with PMHx impacting their performance during ADL tasks, including: HTN, CVA  Prior to admission to the hospital Pt was performing ADLs without physical assistance  IADLs with physical assistance  Functional transfers/ambulation without physical assistance  Cognitive status was PTA was intact  OT order placed to assess Pt's ADLs, cognitive status, and performance during functional tasks in order to maximize safety and independence while making most appropriate d/c recommendations   Pt's clinical presentation is currently unstable/unpredictable given new onset deficits that effect Pt's occupational performance and ability to safely return to OF including decrease activity tolerance, decrease standing balance, decrease sitting balance, decrease performance during ADL tasks, decrease cognition, decrease safety awareness , increase impulsiveness, decrease BUE ROM, decrease UB MS, increased pain, decrease generalized strength, decrease activity engagement, decrease performance during functional transfers, decrease GM control, frequent falls, high fall risk and limited insight to deficits combined with medical complications of poor blood pressure control, pain impacting overall mobility status, change in mental status, A-fib, abnormal H&H, abnormal CBC, abnormal sodium values, low SpO2 values, new onset O2 use, edema/swelling, decreased skin integrity, impulsivity during admission, fear/retreat and need for input for mobility technique/safety  Pt on 2L of O2 at start of session satting at 97%  Pt then trailed on RA and maintaining >92% with no SOB noted  Personal factors affecting Pt at time of initial evaluation include: availability as recommended, limited home support, advanced age, behavioral pattern, inability to perform current job functions, inability to perform IADLs, inability to perform ADLs, new need for AD, inability to ambulate household distances, inability to navigate community distances, limited insight into impairments, decreased initiation and engagement, recent fall(s)/fall history and questionable non-compliance   Pt will benefit from continued skilled OT services to address deficit     OT Discharge Recommendation: Post acute rehabilitation services

## 2022-04-01 NOTE — PLAN OF CARE
Problem: PHYSICAL THERAPY ADULT  Goal: Performs mobility at highest level of function for planned discharge setting  See evaluation for individualized goals  Description: Treatment/Interventions: Functional transfer training,LE strengthening/ROM,Elevations,Therapeutic exercise,Endurance training,Patient/family training,Equipment eval/education,Bed mobility,Gait training,Compensatory technique education,Spoke to nursing,Spoke to case management,OT  Equipment Recommended:  (TBD by rehab)       See flowsheet documentation for full assessment, interventions and recommendations  6/7/3272 [id number] by Cirilo Plan, PT  Note: Prognosis: Good  Problem List: Decreased strength,Impaired balance,Decreased endurance,Decreased mobility,Decreased cognition,Impaired judgement,Decreased safety awareness,Impaired hearing,Decreased skin integrity,Orthopedic restrictions,Pain  Pt tolerated session fairly  She reports fatigue  She was able to achieve standing with pulling from bedrail for support and able to take 1 step with use of bedrail with R UE support  She was able to complete partial squat pivot transfer to recliner with increased assistance and multiple trials  She requires increased rest between activities  She is limited by decreased strength, balance, endurance  She will continue to benefit from PT services to maximize LOF  Barriers to Discharge: Decreased caregiver support,Inaccessible home environment  Barriers to Discharge Comments: requires assistance to complete all mobility     PT Discharge Recommendation: Post acute rehabilitation services          See flowsheet documentation for full assessment

## 2022-04-01 NOTE — PROGRESS NOTES
Progress Note - Orthopedics   Kirby Vogt 80 y o  female MRN: [id number]  Unit/Bed#:       Subjective:    80 y  o female POD1 status post left hip hemiarthroplasty  The patient also has a left proximal humerus fracture being treated conservatively with a sling  No acute overnight events, no complaints  Pt doing well  Pain controlled with Tylenol  The patient notes that her pain has significantly improved following the surgery  Denies fevers chills, CP, SOB  The patient denies any numbness or tingling in the lower and upper left extremities       Labs:  0   Lab Value Date/Time    HCT 33 2 (L) 04/01/2022 0507    HCT 41 6 03/31/2022 0405    HCT 44 7 03/30/2022 0555    HGB 11 6 04/01/2022 0507    HGB 14 0 03/31/2022 0405    HGB 15 2 03/30/2022 0555    INR 1 33 (H) 06/19/2021 2342    WBC 8 90 04/01/2022 0507    WBC 11 01 (H) 03/31/2022 0405    WBC 10 56 (H) 03/30/2022 0555       Meds:    Current Facility-Administered Medications:     acetaminophen (TYLENOL) tablet 650 mg, 650 mg, Oral, Q6H PRN, Sarath Liming, PA-C, 650 mg at 04/01/22 2720    aluminum-magnesium hydroxide-simethicone (MYLANTA) oral suspension 30 mL, 30 mL, Oral, Q6H PRN, HUDSON Anguiano-C    amLODIPine (NORVASC) tablet 5 mg, 5 mg, Oral, Daily, HUDSON Anguiano-C, 5 mg at 04/01/22 0830    apixaban (ELIQUIS) tablet 2 5 mg, 2 5 mg, Oral, BID, Rip Wayne PA-C, 2 5 mg at 04/01/22 0830    atorvastatin (LIPITOR) tablet 20 mg, 20 mg, Oral, Daily, Sarath Liming, PA-C, 20 mg at 04/01/22 0830    digoxin (LANOXIN) tablet 125 mcg, 125 mcg, Oral, Daily, Sarath Liming, PA-C, 125 mcg at 04/01/22 0830    lactated ringers bolus 500 mL, 500 mL, Intravenous, Once PRN **AND** lactated ringers bolus 500 mL, 500 mL, Intravenous, Once PRN, Sarath Liming, PA-C    lactated ringers infusion, 125 mL/hr, Intravenous, Continuous, Sarath Liming, PA-C, Last Rate: 125 mL/hr at 04/01/22 0627, 125 mL/hr at 04/01/22 0627    metoprolol succinate (TOPROL-XL) 24 hr tablet 100 mg, 100 mg, Oral, Daily, Lraa Cheadle, PA-C, 100 mg at 04/01/22 0830    morphine injection 2 mg, 2 mg, Intravenous, Q4H PRN, Lara Cheadle, PA-C    naloxone [facility]) 0 04 mg/mL syringe 0 04 mg, 0 04 mg, Intravenous, Q1MIN PRN, Lara Cheadle, PA-C    ondansetron [facility]) injection 4 mg, 4 mg, Intravenous, Q6H PRN, Lara Cheadle, PA-C, 4 mg at 03/31/22 1037    oxyCODONE (ROXICODONE) IR tablet 2 5 mg, 2 5 mg, Oral, Q6H PRN, Lara Cheadle, PA-C, 2 5 mg at 03/30/22 1205    polyethylene glycol (MIRALAX) packet 17 g, 17 g, Oral, Daily PRN, Lara Cheadle, PA-C    [facility]) tablet 8 6 mg, 1 tablet, Oral, HS PRN, Lara Cheadle, PA-C    sodium chloride 0 9 % bolus 500 mL, 500 mL, Intravenous, Once PRN **AND** sodium chloride 0 9 % bolus 500 mL, 500 mL, Intravenous, Once PRN, Lara Cheadle, PA-C    Blood Culture:   No results found for: BLOODCX    Wound Culture:   No results found for: WOUNDCULT    Ins and Outs:  I/O last 24 hours: In: 3724 6 [P O :180; I V :3444 6; IV Piggyback:100]  Out: 1135 [Urine:1035; Blood:100]      Physical:  Vitals:    04/01/22 0916   BP: 117/57   Pulse: 81   Resp: 14   Temp:    SpO2: 94%     Musculoskeletal: left Lower Extremity   · Patient lying in bed comfortably, in no acute distress  · Dressing is clean, dry, and intact without serosanguinous strike-through  · Skin without lesions, ecchymosis, erythema, swelling, warmth, or other signs of infection  · SILT s/s/sp/dp/t    · +fhl/ehl, +ankle dorsi/plantar flexion  · 2+ DP pulse  · Soft lower extremity compartments    Left upper extremity  · Sling is in place correctly  · SILT m/r/u    · Motor intact ain/pin/m/r/u  · 2+ radial pulse    Assessment:    80 y  o female POD1 status post left hip hemiarthroplasty; left proximal humerus fracture; ambulatory dysfunction    Plan:  · WBAT LLE, NWB LUE  · Sling LUE  · HGB 11 6 this AM  Greater than 2 gram drop which qualifies for diagnosis of acute blood loss anemia, will monitor and administer IVF/prbc as indicated   · PT/OT gait training  · Pain control per primary team  · DVT ppx with Eliquis  · Dispo: Ortho will follow   · Discharge planning to [facility], case management on board  · Patient to follow up with Dr Larsen Stands as outpatient once discharged for continued management of her hip and humerus fracture        Patrice Mims PA-C

## 2022-04-01 NOTE — OCCUPATIONAL THERAPY NOTE
Occupational Therapy Evaluation     Evaluation: 9297-2780  Treatment: 3297-9920    Patient Name: Ghada Peck  PGBJP'L Date: 4/1/2022  Problem List  Principal Problem:    Transcervical fracture of left femur, closed, initial encounter [facility])  Active Problems:    Closed comminuted fracture of left humerus    Hypertensive urgency    Atrial fibrillation ([address] )    Hyponatremia    Past Medical History  Past Medical History:   Diagnosis Date    Hypertension     Stroke [facility])      Past Surgical History  Past Surgical History:   Procedure Laterality Date    MT PARTIAL HIP REPLACEMENT Left 3/31/2022    Procedure: HEMIARTHROPLASTY HIP (BIPOLAR); Surgeon: Margy Whitmore; Location:  MAIN OR;  Service: Orthopedics           04/01/22 0911   Note Type   Note type Evaluation   Restrictions/Precautions   Weight Bearing Precautions Per Order Yes   LUE Weight Bearing Per Order NWB   LLE Weight Bearing Per Order WBAT   Braces or Orthoses Sling  (LUE)   Other Precautions Chair Alarm; Bed Alarm;Cognitive;Hard of hearing;Multiple lines; Fall Risk;O2;WBS  (2L start of session)   Pain Assessment   Pain Assessment Tool FLACC   Pain Score 5   Pain Location/Orientation Orientation: Left; Location: Shoulder; Location: Hip   Pain Rating: FLACC (Rest) - Face 0   Pain Rating: FLACC (Rest) - Legs 0   Pain Rating: FLACC (Rest) - Activity 0   Pain Rating: FLACC (Rest) - Cry 0   Pain Rating: FLACC (Rest) - Consolability 0   Score: FLACC (Rest) 0   Pain Rating: FLACC (Activity) - Face 1   Pain Rating: FLACC (Activity) - Legs 1   Pain Rating: FLACC (Activity) - Activity 0   Pain Rating: FLACC (Activity) - Cry 1   Pain Rating: FLACC (Activity) - Consolability 0   Score: FLACC (Activity) 3   Home Living   Type of Home House  (2 LILA B HR)   Home Layout One level;Performs ADLs on one level; Able to live on main level with bedroom/bathroom   Bathroom Shower/Tub   (sponge bathes)   Additional Comments Pt questionable historian, unable to obtain consistant information regarding home set-up or PLOF at home time  will consult with CM as able   Prior Function   Level of [place] Independent with ADLs and functional mobility   Lives With Son  Issa Escamilla)   Cezar Help From Family   ADL Assistance Independent   IADLs Needs assistance   Falls in the last 6 months 1 to 4   Comments Pt reports completing ADLs and functional ambulation @ Mod I  pt has assistance for IADLs and community mobility from her son/family  ADL   Where Assessed Edge of bed   Grooming Assistance 5  Supervision/Setup   Grooming Deficit Setup;Verbal cueing;Supervision/safety; Increased time to complete  (use of RUE only)   UB Bathing Assistance 3  Moderate Assistance   UB Bathing Deficit Steadying;Verbal cueing;Supervision/safety; Increased time to complete;Right arm;Left arm   LB Bathing Assistance 2  Maximal Assistance   LB Bathing Deficit Steadying;Verbal cueing;Supervision/safety; Increased time to complete;Right arm;Perineal area; Buttocks;Right lower leg including foot; Left lower leg including foot   UB Dressing Assistance 2  Maximal Assistance   UB Dressing Deficit Steadying;Verbal cueing;Supervision/safety; Increased time to complete;Pull over head;Pull around back; Thread LUE   LB Dressing Assistance 1  Total Assistance   LB Dressing Deficit Steadying; Requires assistive device for steadying;Verbal cueing;Supervision/safety; Increased time to complete; Don/doff R sock; Don/doff L sock; Thread RLE into pants; Thread LLE into pants;Pull up over hips   Additional Comments Pt completing ADL tasks while seated at EOB  UB dressing @ Max A d/t LUE being in sling and requiring Max A to maintain LUE precautions  LB Dressing @ Total A  Pt with posterior hip precaustion with Poor carryover  Pt unable to forest socks/pants at this time  Pt then requiring Total A for CM around waist d/t reqiuring RUE supported on grab bar at all times while standing      Bed Mobility   Supine to Sit 2  Maximal assistance   Additional items Assist x 2; Increased time required;Verbal cues;LE management  (trunk management)   Additional Comments HOB elevated and no use of bedrails   Transfers   Sit to Stand 2  Maximal assistance  (achieving 3/4 stand only)   Additional items Assist x 2; Increased time required;Verbal cues   Stand to Sit 2  Maximal assistance   Additional items Assist x 1; Increased time required;Verbal cues   Stand pivot Unable to assess   Additional Comments Pt completing STS from EOB with use of HHA RUE ONLY  Pt requiring B knees blocked to complete STS and achieving 3/4 stand  Pt unable to safely attempt SPT at this time  Please refer to treatment note for performance during squat pivot  Balance   Static Sitting Fair   Dynamic Sitting Fair -   Static Standing Poor -   Dynamic Standing Poor -   Activity Tolerance   Activity Tolerance Patient limited by fatigue;Patient limited by pain   Medical Staff Made Aware Spoke with PT, Christ Guzman   Nurse Made Aware Spoke with RN, Jerardo Toney Assessment   RUE Assessment WFL   LUE Assessment   LUE Assessment X   Hand Function   Gross Motor Coordination Impaired  (LUE impaired d/t humeral fracture)   Fine Motor Coordination Functional   Sensation   Light Touch No apparent deficits   Cognition   Overall Cognitive Status Impaired   Arousal/Participation Alert; Responsive; Cooperative   Attention Difficulty attending to directions   Orientation Level Oriented to place;Oriented to person;Oriented to situation;Disoriented to time   Memory Decreased long term memory   Following Commands Follows one step commands with increased time or repetition   Comments Pt plesantly confused at times  vc'ing to re-direct  repitions rquired to ensure proper follow through   Assessment   Limitation Decreased ADL status; Decreased UE strength;Decreased Safe judgement during ADL;Decreased cognition;Decreased endurance;Decreased UE ROM; Decreased self-care trans;Decreased high-level ADLs; Non-func L UE   Prognosis Good Assessment Pt is a 80 y o  female, admitted to [address] [place] [address] 3/29/2022 d/t experiencing a fall at home resulting in L shoulder pain and L hip pain  Dx: transcervical fracture of L femur and fracture of L proximal humerus  Pt underwent surgical intervention on 3/31/2022 - HEMIARTHROPLASTY HIP (BIPOLAR) (Left)  Pt is currently WBAT LLE with posterior hip precautions  LUE NWB in sling  Pt with PMHx impacting their performance during ADL tasks, including: HTN, CVA  Prior to admission to the hospital Pt was performing ADLs without physical assistance  IADLs with physical assistance  Functional transfers/ambulation without physical assistance  Cognitive status was PTA was intact  OT order placed to assess Pt's ADLs, cognitive status, and performance during functional tasks in order to maximize safety and independence while making most appropriate d/c recommendations  Pt's clinical presentation is currently unstable/unpredictable given new onset deficits that effect Pt's occupational performance and ability to safely return to [facility] including decrease activity tolerance, decrease standing balance, decrease sitting balance, decrease performance during ADL tasks, decrease cognition, decrease safety awareness , increase impulsiveness, decrease BUE ROM, decrease UB MS, increased pain, decrease generalized strength, decrease activity engagement, decrease performance during functional transfers, decrease GM control, frequent falls, high fall risk and limited insight to deficits combined with medical complications of poor blood pressure control, pain impacting overall mobility status, change in mental status, A-fib, abnormal H&H, abnormal CBC, abnormal sodium values, low SpO2 values, new onset O2 use, edema/swelling, decreased skin integrity, impulsivity during admission, fear/retreat and need for input for mobility technique/safety  Pt on 2L of O2 at start of session satting at 97%   Pt then trailed on RA and maintaining >92% with no SOB noted  Personal factors affecting Pt at time of initial evaluation include: availability as recommended, limited home support, advanced age, behavioral pattern, inability to perform current job functions, inability to perform IADLs, inability to perform ADLs, new need for AD, inability to ambulate household distances, inability to navigate community distances, limited insight into impairments, decreased initiation and engagement, recent fall(s)/fall history and questionable non-compliance  Pt will benefit from continued skilled OT services to address deficit   Plan   Treatment Interventions ADL retraining;Functional transfer training;UE strengthening/ROM; Endurance training;Cognitive reorientation;Patient/family training;Equipment evaluation/education; Neuromuscular reeducation; Compensatory technique education;Continued evaluation; Energy conservation; Activityengagement   Goal Expiration Date 04/15/22   OT Treatment Day 1   OT Frequency 3-5x/wk   Additional Treatment Session   Start Time 8848   End Time 3032   Treatment Assessment Pt seen for treatment session #1 this date  Pt alert and agreeable to participate at this time  Pt completing squat pivot transfer from EOB to drop arm recliner @ Max x's 1 + Mod x's 1  Pt tolearting well with goo dparticipation  on 2nd squat pivot, pt requiring Max x's 1 + Min x's 1  Pt then completing STS from recliner chair with use of grab bar @ Max x's 2 and able to achieve full stand  Once standing Pt requiring Max x's 1 + Min x's 1 to maintain balance and assist with weight shifitn from L to R  Pt's CATHLEENE remains in sling at this time  baring weight through RUE only  Pt then completing STS from recliner with use of grab bars placed at R side of Pt in order to attempt functional ambulation  Pt able to achieve full stand and then progress BLE 2 steps with Max x's 1 + Mod x's 1  Pt with good participation and demonstrated Good potential to make progress towards goals   Pt seated OOB in john jones at end of session with call bell in reach, chair alarm intact and all needs met at this time  Additional Treatment Day 1   Recommendation   OT Discharge Recommendation Post acute rehabilitation services   AM-[facility] Daily Activity Inpatient   Lower Body Dressing 1   Bathing 1   Toileting 1   Upper Body Dressing 2   Grooming 3   Eating 3   Daily Activity Raw Score 11   Daily Activity Standardized Score (Calc for Raw Score >=11) 29 04   AM-PAC Applied Cognition Inpatient   Following a Speech/Presentation 2   Understanding Ordinary Conversation 3   Taking Medications 1   Remembering Where Things Are Placed or Put Away 2   Remembering List of 4-5 Errands 2   Taking Care of Complicated Tasks 1   Applied Cognition Raw Score 11   Applied Cognition Standardized Score 27 03     The patient's raw score on the AM-PAC Daily Activity inpatient short form is 11, standardized score is 29 04, less than 39 4  Patients at this level are likely to benefit from DC to post-acute rehabilitation services  Please refer to the recommendation of the Occupational Therapist for safe DC planning  Pt goals to be met by 4/15/2022    1  Pt will demonstrate ability to complete supine<>sit @ S in order to increase safety and independence during ADL tasks  2  Pt will demonstrate ability to complete UB ADLs including washing/dressing @ S in order to increase performance and participation during meaningful tasks  3  Pt will demonstrate ability to complete LB dressing @ Mod A with AD PRNin order to increase safety and independence during meaningful tasks  4  Pt will demonstrate ability to forest/doff socks/shoes while sitting EOB @ Mod A with AD PRN in order to increase safety and independence during meaningful tasks  5  Pt will demonstrate ability to complete toileting tasks including CM and pericare @ Mod A in order to increase safety and independence during meaningful tasks    6  Pt will demonstrate ability to complete EOB, chair, toilet/commode transfers @ Mod A in order to increase performance and participation during functional tasks  7  Pt will demonstrate ability to stand for 3-4 minutes while maintaining F+ balance with use of RW for UB support PRN  8  Pt will demonstrate ability to tolerate 30-35 minute OT session with no vc'ing for deep breathing or use of energy conservation techniques in order to increase activity tolerance during functional tasks  9  Pt will demonstrate Good carryover of use of energy conservation/compensatory strategies during ADLs and functional tasks in order to increase safety and reduce risk for falls  10  Pt will demonstrate Good attention and participation in continued evaluation of functional ambulation house hold distances in order to assist with safe d/c planning  11  Pt will attend to continued cognitive assessments 100% of the time in order to provide most appropriate d/c recommendations  12  Pt will follow 100% simple 2-step commands and be A&O x4 consistently with environmental cues to increase participation in functional activities  13  Pt will identify 3 areas of interest/hobbies and 1 intervention on how to incorporate into daily life in order to increase interaction with environment and peers as well as increase participation in meaningful tasks  14  Pt will demonstrate 100% carryover of BUE HEP in order to increase BUE MS and increase performance during functional tasks upon d/c home      Millie Chu OTR/L

## 2022-04-02 PROBLEM — D62 ACUTE BLOOD LOSS ANEMIA: Status: ACTIVE | Noted: 2022-04-02

## 2022-04-02 LAB
ANION GAP SERPL CALCULATED.3IONS-SCNC: 2 MMOL/L (ref 4–13)
BUN SERPL-MCNC: 16 MG/DL (ref 5–25)
CALCIUM SERPL-MCNC: 7.4 MG/DL (ref 8.3–10.1)
CHLORIDE SERPL-SCNC: 99 MMOL/L (ref 100–108)
CO2 SERPL-SCNC: 29 MMOL/L (ref 21–32)
CREAT SERPL-MCNC: 0.41 MG/DL (ref 0.6–1.3)
ERYTHROCYTE [DISTWIDTH] IN BLOOD BY AUTOMATED COUNT: 14 % (ref 11.6–15.1)
GFR SERPL CREATININE-BSD FRML MDRD: 86 ML/MIN/1.73SQ M
GLUCOSE SERPL-MCNC: 104 MG/DL (ref 65–140)
HCT VFR BLD AUTO: 27.9 % (ref 34.8–46.1)
HGB BLD-MCNC: 9.8 G/DL (ref 11.5–15.4)
MCH RBC QN AUTO: 31.2 PG (ref 26.8–34.3)
MCHC RBC AUTO-ENTMCNC: 35.1 G/DL (ref 31.4–37.4)
MCV RBC AUTO: 89 FL (ref 82–98)
PLATELET # BLD AUTO: 130 THOUSANDS/UL (ref 149–390)
PMV BLD AUTO: 10.3 FL (ref 8.9–12.7)
POTASSIUM SERPL-SCNC: 4.6 MMOL/L (ref 3.5–5.3)
RBC # BLD AUTO: 3.14 MILLION/UL (ref 3.81–5.12)
SODIUM SERPL-SCNC: 130 MMOL/L (ref 136–145)
WBC # BLD AUTO: 8.78 THOUSAND/UL (ref 4.31–10.16)

## 2022-04-02 PROCEDURE — 99024 POSTOP FOLLOW-UP VISIT: CPT | Performed by: PHYSICIAN ASSISTANT

## 2022-04-02 PROCEDURE — 99232 SBSQ HOSP IP/OBS MODERATE 35: CPT | Performed by: INTERNAL MEDICINE

## 2022-04-02 PROCEDURE — 80048 BASIC METABOLIC PNL TOTAL CA: CPT | Performed by: ORTHOPAEDIC SURGERY

## 2022-04-02 PROCEDURE — 85027 COMPLETE CBC AUTOMATED: CPT | Performed by: ORTHOPAEDIC SURGERY

## 2022-04-02 RX ADMIN — APIXABAN 2.5 MG: 2.5 TABLET, FILM COATED ORAL at 18:05

## 2022-04-02 RX ADMIN — AMLODIPINE BESYLATE 5 MG: 5 TABLET ORAL at 09:01

## 2022-04-02 RX ADMIN — SODIUM CHLORIDE, SODIUM LACTATE, POTASSIUM CHLORIDE, AND CALCIUM CHLORIDE 125 ML/HR: .6; .31; .03; .02 INJECTION, SOLUTION INTRAVENOUS at 07:52

## 2022-04-02 RX ADMIN — ATORVASTATIN CALCIUM 20 MG: 20 TABLET, FILM COATED ORAL at 09:01

## 2022-04-02 RX ADMIN — APIXABAN 2.5 MG: 2.5 TABLET, FILM COATED ORAL at 09:01

## 2022-04-02 RX ADMIN — METOPROLOL SUCCINATE 100 MG: 100 TABLET, EXTENDED RELEASE ORAL at 09:01

## 2022-04-02 RX ADMIN — DIGOXIN 125 MCG: 125 TABLET ORAL at 09:01

## 2022-04-02 NOTE — ASSESSMENT & PLAN NOTE
- drop in hemoglobin status post arthroplasty  - no obvious outward signs or symptoms of bleeding  - continue to monitor daily hemoglobin

## 2022-04-02 NOTE — PROGRESS NOTES
[address]  Progress Note - Gamal Galeas 2/19/1925, 80 y o  female MRN: [id number]  Unit/Bed#: -01 Encounter: [id number]  Primary Care Provider: Jeanie Navarro MD   Date and time admitted to hospital: 3/29/2022  6:45 PM    * Transcervical fracture of left femur, closed, initial encounter [facility])  Assessment & Plan  - status post mechanical fall and subsequent left femur fracture    - status post left hip hemiarthroplasty with Dr Saima Miramontes on 03/31  - cont home Eliquis for DVT prophylaxis  - PT/OT eval  - will need rehab on discharge        Closed comminuted fracture of left humerus  Assessment & Plan  - status post mechanical fall with trauma to the left side  - CT left upper extremity confirms left humerus fracture  - nonweightbearing status and sling per Orthopedic surgery  No surgical intervention required  Acute blood loss anemia  Assessment & Plan  - drop in hemoglobin status post arthroplasty  - no obvious outward signs or symptoms of bleeding  - continue to monitor daily hemoglobin    Hyponatremia  Assessment & Plan  - mild hyponatremia on admission, suspect secondary to decreased p o  Intake  - improving without intervention  - daily BMP    Atrial fibrillation (HCC)  Assessment & Plan  - Continue rate control with digoxin, metoprolol  - resume Eliquis for stroke ppx    Hypertensive urgency  Assessment & Plan  - blood pressure much better control now back on home Norvasc and metoprolol  - continue to monitor and titrate as necessary    Elevated troponin-resolved as of 3/31/2022  Assessment & Plan  - Troponin 11, 32, 70  - no ischemic changes on EKG and patient without complaint  - this is non MI related elevation in troponin, no further workup indicated at this time        VTE Pharmacologic Prophylaxis: VTE Score: 10 High Risk (Score >/= 5) - Pharmacological DVT Prophylaxis Ordered: apixaban (Eliquis)  Sequential Compression Devices Ordered  Patient Centered Rounds:  I performed bedside rounds with nursing staff today  Discussions with Specialists or Other Care Team Provider: CHANTAL  Ortho  Education and Discussions with Family / Patient: Patient declined call to   Time Spent for Care: 30 minutes  More than 50% of total time spent on counseling and coordination of care as described above  Current Length of Stay: 4 day(s)  Current Patient Status: Inpatient   Certification Statement: The patient will continue to require additional inpatient hospital stay due to femoral fracture and surgical intervention  Discharge Plan: Anticipate discharge in 24-48 hrs to rehab facility  Code Status: Level 3 - DNAR and DNI    Subjective:   Patient seen examined  Following up for femoral neck fracture and humeral fracture  Patient again with no complaints today  Feeling well  Pain is controlled  Afebrile  Objective:     Vitals:   Temp (24hrs), Av 9 °F (36 6 °C), Min:97 9 °F (36 6 °C), Max:98 °F (36 7 °C)    Temp:  [97 9 °F (36 6 °C)-98 °F (36 7 °C)] 98 °F (36 7 °C)  HR:  [] 100  Resp:  [14-18] 18  BP: (115-163)/(57-85) 163/85  SpO2:  [92 %-94 %] 92 %  Body mass index is 23 44 kg/m²  Input and Output Summary (last 24 hours): Intake/Output Summary (Last 24 hours) at 2022 0910  Last data filed at 2022 0901  Gross per 24 hour   Intake 2215 84 ml   Output 1505 ml   Net 710 84 ml       PHYSICAL EXAM:    Vitals signs reviewed  Constitutional   Awake and cooperative  NAD  Head/Neck   Normocephalic  Atraumatic  HEENT   No scleral icterus  EOMI  Heart   Regular rate and rhythm  No murmers  Lungs   Clear to auscultation bilaterally  Respirations unlaboured  Abdomen   Soft  Nontender  Nondistended  Skin   Skin color normal  No rashes  Extremities   No deformities  Left upper extremity sling  Left lateral hip bandaged, clean and dry  Neuro   Alert and oriented  No new deficits  Psych   Mood stable   Affect normal          Additional Data:     Labs:  Results from last 7 days   Lab Units 04/02/22  0615 03/31/22  0405 03/30/22  0555   WBC Thousand/uL 8 78   < > 10 56*   HEMOGLOBIN g/dL 9 8*   < > 15 2   HEMATOCRIT % 27 9*   < > 44 7   PLATELETS Thousands/uL 130*   < > 161   NEUTROS PCT %  --   --  79*   LYMPHS PCT %  --   --  13*   MONOS PCT %  --   --  6   EOS PCT %  --   --  1    < > = values in this interval not displayed  Results from last 7 days   Lab Units 04/02/22  0615 03/31/22  0405 03/30/22  0555   SODIUM mmol/L 130*   < > 129*   POTASSIUM mmol/L 4 6   < > 4 0   CHLORIDE mmol/L 99*   < > 90*   CO2 mmol/L 29   < > 32   BUN mg/dL 16   < > 13   CREATININE mg/dL 0 41*   < > 0 97   ANION GAP mmol/L 2*   < > 7   CALCIUM mg/dL 7 4*   < > 8 4   ALBUMIN g/dL  --   --  3 1*   TOTAL BILIRUBIN mg/dL  --   --  2 13*   ALK PHOS U/L  --   --  63   ALT U/L  --   --  15   AST U/L  --   --  21   GLUCOSE RANDOM mg/dL 104   < > 150*    < > = values in this interval not displayed  Lines/Drains:  Invasive Devices  Report    Peripheral Intravenous Line            Peripheral IV 03/31/22 Right Wrist 1 day          Drain            External Urinary Catheter <1 day              Urinary Catheter:  Goal for removal: Voiding trial when ambulation improves               Imaging: No pertinent imaging reviewed      Recent Cultures (last 7 days):         Last 24 Hours Medication List:   Current Facility-Administered Medications   Medication Dose Route Frequency Provider Last Rate    acetaminophen  650 mg Oral Q6H PRN Stephy Jackson PA-C      aluminum-magnesium hydroxide-simethicone  30 mL Oral Q6H PRN Stephy Jackson PA-C      amLODIPine  5 mg Oral Daily Stepyh Jackson PA-C      apixaban  2 5 mg Oral BID Stephy Jackson PA-C      atorvastatin  20 mg Oral Daily Carmen Woods      digoxin  125 mcg Oral Daily Carmen Woods      lactated ringers  125 mL/hr Intravenous Continuous Stephy Jackson PA-C 125 mL/hr (04/02/22 7062)   Kathryn Carlson metoprolol succinate  100 mg Oral Daily Rodriguez Ho, DEVORAH      morphine injection  2 mg Intravenous Q4H PRN Rodriguez Ho, DEVORAH      naloxone  0 04 mg Intravenous Q1MIN PRN Rodriguez Ho, DEVORAH      ondansetron  4 mg Intravenous Q6H PRN Rodriguez [place], PA-SHARRI      oxyCODONE  2 5 mg Oral Q6H PRN Rodriguez Ho, DEVORAH      polyethylene glycol  17 g Oral Daily PRN Rodriguez Ho, DEVORAH      senna  1 tablet Oral HS PRN Rodriguez Ho, DEVORAH          Today, Patient Was Seen By: Tiesha Post DO    **Please Note: This note may have been constructed using a voice recognition system  **

## 2022-04-02 NOTE — PROGRESS NOTES
Progress Note - Orthopedics   Luis Daniel Mcginnis 80 y o  female MRN: [id number]  Unit/Bed#: -01 Encounter: [id number]    Assessment:  POD 2 s/p left hip hemiarthroplasty; left proximal humerus fracture    Plan:  Continue physical therapy and occupational therapy weight-bearing as tolerated left lower extremity with hip precautions  Eliquis for DVT prophylaxis  Out of bed  Continue sling for her left proximal humerus fracture  Nonweightbearing left upper extremity  DC planning  Acute postop blood-loss anemia continue to monitor    Weight bearing:  Left lower extremity weight-bearing as tolerated with hip precautions  Left upper extremity nonweightbearing    VTE Pharmacologic Prophylaxis:  Eliquis  VTE Mechanical Prophylaxis: sequential compression device    Subjective:  Pain is well controlled  The patient is resting comfortably in bed  Vitals: Blood pressure 129/79, pulse 72, temperature 97 9 °F (36 6 °C), temperature source Temporal, resp  rate 18, height 5' 5" (1 651 m), weight 63 9 kg (140 lb 14 oz), SpO2 92 %  ,Body mass index is 23 44 kg/m²        Intake/Output Summary (Last 24 hours) at 4/2/2022 0701  Last data filed at 4/2/2022 0605  Gross per 24 hour   Intake 2645 84 ml   Output 1275 ml   Net 1370 84 ml       Invasive Devices  Report    Peripheral Intravenous Line            Peripheral IV 03/31/22 Right Wrist 1 day          Drain            External Urinary Catheter <1 day                Physical Exam:   Left lower extremity neurovascularly intact  Toes are pink and mobile  Compartments are soft  Dressing is clean, dry and intact  Good dorsiflexion and plantar flexion of ankle  Good quad tone  Brisk cap refill  Sensation intact    Left upper extremity is neurovascularly intact  Fingers are pink and mobile  Compartments are soft  Sling in place  Brisk cap refill  Sensation intact      Lab, Imaging and other studies:   CBC:   Lab Results   Component Value Date    WBC 8 78 04/02/2022    HGB 9 8 (L) 04/02/2022    HCT 27 9 (L) 04/02/2022    MCV 89 04/02/2022     (L) 04/02/2022    MCH 31 2 04/02/2022    MCHC 35 1 04/02/2022    RDW 14 0 04/02/2022    MPV 10 3 04/02/2022     CMP:   Lab Results   Component Value Date    SODIUM 130 (L) 04/02/2022    CL 99 (L) 04/02/2022    CO2 29 04/02/2022    BUN 16 04/02/2022    CREATININE 0 41 (L) 04/02/2022    CALCIUM 7 4 (L) 04/02/2022    EGFR 86 04/02/2022

## 2022-04-03 LAB
ANION GAP SERPL CALCULATED.3IONS-SCNC: 4 MMOL/L (ref 4–13)
BUN SERPL-MCNC: 14 MG/DL (ref 5–25)
CALCIUM SERPL-MCNC: 7.9 MG/DL (ref 8.3–10.1)
CHLORIDE SERPL-SCNC: 99 MMOL/L (ref 100–108)
CO2 SERPL-SCNC: 30 MMOL/L (ref 21–32)
CREAT SERPL-MCNC: 0.61 MG/DL (ref 0.6–1.3)
GFR SERPL CREATININE-BSD FRML MDRD: 76 ML/MIN/1.73SQ M
GLUCOSE SERPL-MCNC: 96 MG/DL (ref 65–140)
HCT VFR BLD AUTO: 33.5 % (ref 34.8–46.1)
HGB BLD-MCNC: 11.6 G/DL (ref 11.5–15.4)
POTASSIUM SERPL-SCNC: 3.9 MMOL/L (ref 3.5–5.3)
SODIUM SERPL-SCNC: 133 MMOL/L (ref 136–145)

## 2022-04-03 PROCEDURE — 97116 GAIT TRAINING THERAPY: CPT

## 2022-04-03 PROCEDURE — 80048 BASIC METABOLIC PNL TOTAL CA: CPT | Performed by: INTERNAL MEDICINE

## 2022-04-03 PROCEDURE — 99232 SBSQ HOSP IP/OBS MODERATE 35: CPT | Performed by: INTERNAL MEDICINE

## 2022-04-03 PROCEDURE — 97530 THERAPEUTIC ACTIVITIES: CPT

## 2022-04-03 PROCEDURE — 85018 HEMOGLOBIN: CPT | Performed by: INTERNAL MEDICINE

## 2022-04-03 PROCEDURE — 85014 HEMATOCRIT: CPT | Performed by: INTERNAL MEDICINE

## 2022-04-03 PROCEDURE — 97110 THERAPEUTIC EXERCISES: CPT

## 2022-04-03 RX ADMIN — AMLODIPINE BESYLATE 5 MG: 5 TABLET ORAL at 08:40

## 2022-04-03 RX ADMIN — ACETAMINOPHEN 325MG 650 MG: 325 TABLET ORAL at 20:59

## 2022-04-03 RX ADMIN — ATORVASTATIN CALCIUM 20 MG: 20 TABLET, FILM COATED ORAL at 08:40

## 2022-04-03 RX ADMIN — METOPROLOL SUCCINATE 100 MG: 100 TABLET, EXTENDED RELEASE ORAL at 08:40

## 2022-04-03 RX ADMIN — ACETAMINOPHEN 325MG 650 MG: 325 TABLET ORAL at 00:41

## 2022-04-03 RX ADMIN — APIXABAN 2.5 MG: 2.5 TABLET, FILM COATED ORAL at 08:40

## 2022-04-03 RX ADMIN — APIXABAN 2.5 MG: 2.5 TABLET, FILM COATED ORAL at 17:35

## 2022-04-03 RX ADMIN — DIGOXIN 125 MCG: 125 TABLET ORAL at 08:40

## 2022-04-03 NOTE — PROGRESS NOTES
[address]  Progress Note - [employer] 2/19/1925, 80 y o  female MRN: [id number]  Unit/Bed#: -01 Encounter: [id number]  Primary Care Provider: Tova Hall MD   Date and time admitted to hospital: 3/29/2022  6:45 PM    * Transcervical fracture of left femur, closed, initial encounter [facility])  Assessment & Plan  - status post mechanical fall and subsequent left femur fracture    - status post left hip hemiarthroplasty with Dr Camila Paul on 03/31  - cont home Eliquis for DVT prophylaxis  - PT/OT eval  - will need rehab on discharge        Closed comminuted fracture of left humerus  Assessment & Plan  - status post mechanical fall with trauma to the left side  - CT left upper extremity confirms left humerus fracture  - nonweightbearing status and sling per Orthopedic surgery  No surgical intervention required  Acute blood loss anemia  Assessment & Plan  - drop in hemoglobin status post arthroplasty  - no obvious outward signs or symptoms of bleeding  - continue to monitor daily hemoglobin    Hyponatremia  Assessment & Plan  - mild hyponatremia on admission, suspect secondary to decreased p o  Intake  - improving without intervention  - daily BMP    Atrial fibrillation (HCC)  Assessment & Plan  - Continue rate control with digoxin, metoprolol  - resume Eliquis for stroke ppx    Hypertensive urgency  Assessment & Plan  - blood pressure much better control now back on home Norvasc and metoprolol  - continue to monitor and titrate as necessary    Elevated troponin-resolved as of 3/31/2022  Assessment & Plan  - Troponin 11, 32, 70  - no ischemic changes on EKG and patient without complaint  - this is non MI related elevation in troponin, no further workup indicated at this time        VTE Pharmacologic Prophylaxis: VTE Score: 10 High Risk (Score >/= 5) - Pharmacological DVT Prophylaxis Ordered: apixaban (Eliquis)  Sequential Compression Devices Ordered  Patient Centered Rounds:  I performed bedside rounds with nursing staff today  Discussions with Specialists or Other Care Team Provider: CHANTAL  Ortho  Education and Discussions with Family / Patient: Patient declined call to   Time Spent for Care: 30 minutes  More than 50% of total time spent on counseling and coordination of care as described above  Current Length of Stay: 5 day(s)  Current Patient Status: Inpatient   Certification Statement: The patient will continue to require additional inpatient hospital stay due to femoral fracture and surgical intervention  Discharge Plan: Anticipate discharge tomorrow to rehab facility  Code Status: Level 3 - DNAR and DNI    Subjective:   Patient seen examined  Following up for femoral neck fracture and humeral fracture  No new complaints today  Pain controlled  Awaiting rehab placement  Objective:     Vitals:   Temp (24hrs), Av 1 °F (36 7 °C), Min:97 5 °F (36 4 °C), Max:98 7 °F (37 1 °C)    Temp:  [97 5 °F (36 4 °C)-98 7 °F (37 1 °C)] 97 5 °F (36 4 °C)  HR:  [78-89] 78  Resp:  [17-18] 17  BP: (117-148)/(58-68) 148/68  SpO2:  [91 %-92 %] 92 %  Body mass index is 23 99 kg/m²  Input and Output Summary (last 24 hours): Intake/Output Summary (Last 24 hours) at 4/3/2022 0951  Last data filed at 4/3/2022 1935  Gross per 24 hour   Intake 1390 42 ml   Output 1125 ml   Net 265 42 ml       PHYSICAL EXAM:    Vitals signs reviewed  Constitutional   Awake and cooperative  NAD  Head/Neck   Normocephalic  Atraumatic  HEENT   No scleral icterus  EOMI  Heart   Regular rate and rhythm  No murmers  Lungs   Clear to auscultation bilaterally  Respirations unlaboured  Abdomen   Soft  Nontender  Nondistended  Skin   Skin color normal  No rashes  Extremities   No deformities  Left upper extremity sling  Left lateral hip bandaged, clean and dry  Neuro   Alert and oriented  No new deficits  Psych   Mood stable   Affect normal          Additional Data: Labs:  Results from last 7 days   Lab Units 04/03/22  0616 04/02/22  0615 04/02/22  0615 03/31/22  0405 03/30/22  0555   WBC Thousand/uL  --   --  8 78   < > 10 56*   HEMOGLOBIN g/dL 11 6   < > 9 8*   < > 15 2   HEMATOCRIT % 33 5*   < > 27 9*   < > 44 7   PLATELETS Thousands/uL  --   --  130*   < > 161   NEUTROS PCT %  --   --   --   --  79*   LYMPHS PCT %  --   --   --   --  13*   MONOS PCT %  --   --   --   --  6   EOS PCT %  --   --   --   --  1    < > = values in this interval not displayed  Results from last 7 days   Lab Units 04/03/22  0616 03/31/22 0405 03/30/22  0555   SODIUM mmol/L 133*   < > 129*   POTASSIUM mmol/L 3 9   < > 4 0   CHLORIDE mmol/L 99*   < > 90*   CO2 mmol/L 30   < > 32   BUN mg/dL 14   < > 13   CREATININE mg/dL 0 61   < > 0 97   ANION GAP mmol/L 4   < > 7   CALCIUM mg/dL 7 9*   < > 8 4   ALBUMIN g/dL  --   --  3 1*   TOTAL BILIRUBIN mg/dL  --   --  2 13*   ALK PHOS U/L  --   --  63   ALT U/L  --   --  15   AST U/L  --   --  21   GLUCOSE RANDOM mg/dL 96   < > 150*    < > = values in this interval not displayed  Lines/Drains:  Invasive Devices  Report    Peripheral Intravenous Line            Peripheral IV 03/31/22 Right Wrist 2 days          Drain            External Urinary Catheter 1 day              Urinary Catheter:  Goal for removal: Voiding trial when ambulation improves               Imaging: No pertinent imaging reviewed      Recent Cultures (last 7 days):         Last 24 Hours Medication List:   Current Facility-Administered Medications   Medication Dose Route Frequency Provider Last Rate    acetaminophen  650 mg Oral Q6H PRN Mickie Must, PA-SHARRI      aluminum-magnesium hydroxide-simethicone  30 mL Oral Q6H PRN Mickie MustDEVORAH      amLODIPine  5 mg Oral Daily Mickie MustDEVORAH      apixaban  2 5 mg Oral BID Mickie Corrigan PA-C      atorvastatin  20 mg Oral Daily Mickie Must, [place]      digoxin  125 mcg Oral Daily Mickie MustDEVORAH      lactated ringers  125 mL/hr Intravenous Continuous Shannon Kaur PA-C Stopped (04/03/22 0242)    metoprolol succinate  100 mg Oral Daily Carmen Mims      morphine injection  2 mg Intravenous Q4H PRN Shannon Kaur PA-C      naloxone  0 04 mg Intravenous Q1MIN PRN Shannon Kaur PA-C      ondansetron  4 mg Intravenous Q6H PRN Shannon Kaur PA-C      oxyCODONE  2 5 mg Oral Q6H PRN Shannon Kaur PA-C      polyethylene glycol  17 g Oral Daily PRN Shannon Kaur PA-C      senna  1 tablet Oral HS PRN Shannon Kaur PA-C          Today, Patient Was Seen By: Jeanie Post DO    **Please Note: This note may have been constructed using a voice recognition system  **

## 2022-04-03 NOTE — PLAN OF CARE
Problem: PHYSICAL THERAPY ADULT  Goal: Performs mobility at highest level of function for planned discharge setting  See evaluation for individualized goals  Description: Treatment/Interventions: Functional transfer training,LE strengthening/ROM,Elevations,Therapeutic exercise,Endurance training,Patient/family training,Equipment eval/education,Bed mobility,Gait training,Compensatory technique education,Spoke to nursing,Spoke to case management,OT  Equipment Recommended:  (TBD by rehab)       See flowsheet documentation for full assessment, interventions and recommendations  Outcome: Progressing  Note: Prognosis: Fair  Problem List: Decreased strength,Decreased range of motion,Decreased endurance,Impaired balance,Decreased mobility,Decreased cognition,Orthopedic restrictions,Pain,Impaired hearing  Assessment: Assessment:  Patient overall tolerated tx session well  She does continue to be limited by NWB status of L UE, fear of falling, pain, THPs, cognitive status, & strength deficits of L LE  She did well using the Quick Move to transfer from EOB into recliner chair with min A x 2  Patient also tolerated ambulating 4 feet using R rail of bed with some encouragement & verbal cues for sequencing, weight shifting, etc   She will continue to benefit from PT services and will benefit from inpt Acute rehab in hopes for a sustainable transition home with support/services from rehab  Barriers to Discharge: Decreased caregiver support,Inaccessible home environment  Barriers to Discharge Comments: NWB L UE, THPs, requires significant assistance for all mobility     PT Discharge Recommendation: Post acute rehabilitation services          See flowsheet documentation for full assessment

## 2022-04-03 NOTE — PLAN OF CARE
Problem: MOBILITY - ADULT  Goal: Maintain or return to baseline ADL function  Description: INTERVENTIONS:  -  Assess patient's ability to carry out ADLs; assess patient's baseline for ADL function and identify physical deficits which impact ability to perform ADLs (bathing, care of mouth/teeth, toileting, grooming, dressing, etc )  - Assess/evaluate cause of self-care deficits   - Assess range of motion  - Assess patient's mobility; develop plan if impaired  - Assess patient's need for assistive devices and provide as appropriate  - Encourage maximum independence but intervene and supervise when necessary  - Involve family in performance of ADLs  - Assess for home care needs following discharge   - Consider OT consult to assist with ADL evaluation and planning for discharge  - Provide patient education as appropriate  Outcome: Progressing  Goal: Maintains/Returns to pre admission functional level  Description: INTERVENTIONS:  - Perform BMAT or MOVE assessment daily    - Set and communicate daily mobility goal to care team and patient/family/caregiver     - Collaborate with rehabilitation services on mobility goals if consulted  - Out of bed for toileting  - Record patient progress and toleration of activity level   Outcome: Progressing     Problem: Prexisting or High Potential for Compromised Skin Integrity  Goal: Skin integrity is maintained or improved  Description: INTERVENTIONS:  - Identify patients at risk for skin breakdown  - Assess and monitor skin integrity  - Assess and monitor nutrition and hydration status  - Monitor labs   - Assess for incontinence   - Turn and reposition patient  - Assist with mobility/ambulation  - Relieve pressure over bony prominences  - Avoid friction and shearing  - Provide appropriate hygiene as needed including keeping skin clean and dry  - Evaluate need for skin moisturizer/barrier cream  - Collaborate with interdisciplinary team   - Patient/family teaching  - Consider wound care consult   Outcome: Progressing     Problem: Potential for Falls  Goal: Patient will remain free of falls  Description: INTERVENTIONS:  - Educate patient/family on patient safety including physical limitations  - Instruct patient to call for assistance with activity   - Consult OT/PT to assist with strengthening/mobility   - Keep Call bell within reach  - Keep bed low and locked with side rails adjusted as appropriate  - Keep care items and personal belongings within reach  - Initiate and maintain comfort rounds  - Make Fall Risk Sign visible to staff  - Apply yellow socks and bracelet for high fall risk patients  - Consider moving patient to room near nurses station  Outcome: Progressing     Problem: PAIN - ADULT  Goal: Verbalizes/displays adequate comfort level or baseline comfort level  Description: Interventions:  - Encourage patient to monitor pain and request assistance  - Assess pain using appropriate pain scale  - Administer analgesics based on type and severity of pain and evaluate response  - Implement non-pharmacological measures as appropriate and evaluate response  - Consider cultural and social influences on pain and pain management  - Notify physician/advanced practitioner if interventions unsuccessful or patient reports new pain  Outcome: Progressing     Problem: INFECTION - ADULT  Goal: Absence or prevention of progression during hospitalization  Description: INTERVENTIONS:  - Assess and monitor for signs and symptoms of infection  - Monitor lab/diagnostic results  - Monitor all insertion sites, i e  indwelling lines, tubes, and drains  - Monitor endotracheal if appropriate and nasal secretions for changes in amount and color  - Edgar appropriate cooling/warming therapies per order  - Administer medications as ordered  - Instruct and encourage patient and family to use good hand hygiene technique  - Identify and instruct in appropriate isolation precautions for identified infection/condition  Outcome: Progressing  Goal: Absence of fever/infection during neutropenic period  Description: INTERVENTIONS:  - Monitor WBC    Outcome: Progressing     Problem: SAFETY ADULT  Goal: Maintain or return to baseline ADL function  Description: INTERVENTIONS:  -  Assess patient's ability to carry out ADLs; assess patient's baseline for ADL function and identify physical deficits which impact ability to perform ADLs (bathing, care of mouth/teeth, toileting, grooming, dressing, etc )  - Assess/evaluate cause of self-care deficits   - Assess range of motion  - Assess patient's mobility; develop plan if impaired  - Assess patient's need for assistive devices and provide as appropriate  - Encourage maximum independence but intervene and supervise when necessary  - Involve family in performance of ADLs  - Assess for home care needs following discharge   - Consider OT consult to assist with ADL evaluation and planning for discharge  - Provide patient education as appropriate  Outcome: Progressing  Goal: Maintains/Returns to pre admission functional level  Description: INTERVENTIONS:  - Perform BMAT or MOVE assessment daily    - Set and communicate daily mobility goal to care team and patient/family/caregiver     - Collaborate with rehabilitation services on mobility goals if consulted  - Out of bed for toileting  - Record patient progress and toleration of activity level   Outcome: Progressing  Goal: Patient will remain free of falls  Description: INTERVENTIONS:  - Educate patient/family on patient safety including physical limitations  - Instruct patient to call for assistance with activity   - Consult OT/PT to assist with strengthening/mobility   - Keep Call bell within reach  - Keep bed low and locked with side rails adjusted as appropriate  - Keep care items and personal belongings within reach  - Initiate and maintain comfort rounds  - Make Fall Risk Sign visible to staff  - Apply yellow socks and bracelet for high fall risk patients  - Consider moving patient to room near nurses station  Outcome: Progressing     Problem: DISCHARGE PLANNING  Goal: Discharge to home or other facility with appropriate resources  Description: INTERVENTIONS:  - Identify barriers to discharge w/patient and caregiver  - Arrange for needed discharge resources and transportation as appropriate  - Identify discharge learning needs (meds, wound care, etc )  - Arrange for interpretive services to assist at discharge as needed  - Refer to Case Management Department for coordinating discharge planning if the patient needs post-hospital services based on physician/advanced practitioner order or complex needs related to functional status, cognitive ability, or social support system  Outcome: Progressing     Problem: Knowledge Deficit  Goal: Patient/family/caregiver demonstrates understanding of disease process, treatment plan, medications, and discharge instructions  Description: Complete learning assessment and assess knowledge base    Interventions:  - Provide teaching at level of understanding  - Provide teaching via preferred learning methods  Outcome: Progressing

## 2022-04-03 NOTE — PHYSICAL THERAPY NOTE
PHYSICAL THERAPY TREATMENT NOTE  NAME:  Zelma Mortimer  DATE: 04/03/22    Length Of Stay: 5  Performed at least 2 patient identifiers during session: Name and Birthday    TREATMENT FLOW SHEET:       04/03/22 1147   PT Last Visit   PT Visit Date 04/03/22   Note Type   Note Type Treatment   Pain Assessment   Pain Assessment Tool FLACC   Pain Location/Orientation   (R heel & R hand)   Patient's Stated Pain Goal No pain   Pain Rating: FLACC (Rest) - Face 0   Pain Rating: FLACC (Rest) - Legs 0   Pain Rating: FLACC (Rest) - Activity 1   Pain Rating: FLACC (Rest) - Cry 1   Pain Rating: FLACC (Rest) - Consolability 1   Score: FLACC (Rest) 3   Pain Rating: FLACC (Activity) - Face 1   Pain Rating: FLACC (Activity) - Legs 1   Pain Rating: FLACC (Activity) - Activity 1   Pain Rating: FLACC (Activity) - Cry 1   Pain Rating: FLACC (Activity) - Consolability 1   Score: FLACC (Activity) 5   Restrictions/Precautions   Weight Bearing Precautions Per Order Yes   LUE Weight Bearing Per Order NWB  (in sling)   LLE Weight Bearing Per Order WBAT   Braces or Orthoses Sling   Other Precautions Chair Alarm   General   Chart Reviewed Yes   Additional Pertinent History Pt is s/p L hip hemiarthroplasty on 3/31/22  Pt has a nonoperative L humeral fx now in sling  Family/Caregiver Present Yes   Cognition   Overall Cognitive Status Impaired   Arousal/Participation Alert; Responsive   Attention Difficulty attending to directions   Memory Decreased recall of precautions   Following Commands Follows one step commands with increased time or repetition   Subjective   Subjective "I think I am doing ok "   Bed Mobility   Supine to Sit 2  Maximal assistance   Additional items Verbal cues;LE management; Increased time required;Assist x 1   Additional Comments PT ensuring adherence to THPs L LE  PT re-positions pt max A to get feet flat on floor in optimal seated position EOB      Transfers   Sit to Stand   (minAx2 quick move & anterior rail; maxAx1 R rail)   Additional items Verbal cues; Increased time required;Assist x 2  (anterior railing)   Stand to Sit 2  Maximal assistance   Additional items   (Assist for eccentric control)   Stand pivot Unable to assess  (utilized [facility] )   Additional Comments Sit to stand using Quick Move: min A x 2  Sit to stand at anterior rail min A x 2 with encouragement and VCs for upright posture  Sit to stand R rail mod - max A x 1  Ambulation/Elevation   Gait pattern Antalgic;Narrow TORSTEN;Step to;Excessively slow; Forward Flexion; Improper Weight shift   Gait Assistance 2  Maximal assist   Additional items Assist x 2   Assistive Device Other (Comment)  (R railing)   Distance 4 feet   Ambulation/Elevation Additional Comments PRE GAIT:  weight shifting at anterior railing A x 2 to ensure no knee buckle L  Gait of 4 feet using R rail (assist for wt shifts & chair follow of 2nd person)   Balance   Static Sitting Fair   Dynamic Sitting Poor +   Static Standing Poor -   Dynamic Standing Poor -   Ambulatory Poor -   Activity Tolerance   Activity Tolerance Patient limited by fatigue;Patient limited by pain; Other (Comment)  (R hand pain)   Medical Staff Made Aware Spoke with Nurse   Nurse Made Aware Shanell Tolliver  (notified of R hand pain & heel pain)   Exercises   Glute Sets 10 reps; Sitting   Knee AROM 10 reps;Bilateral;Sitting   Ankle Pumps 25 reps;AROM; Sitting;Bilateral   Assessment   Prognosis Fair   Problem List Decreased strength;Decreased range of motion;Decreased endurance; Impaired balance;Decreased mobility; Decreased cognition;Orthopedic restrictions;Pain; Impaired hearing   Barriers to Discharge Decreased caregiver support; Inaccessible home environment   Barriers to Discharge Comments NWB L UE, THPs, requires significant assistance for all mobility   Goals   Patient Goals "get better"   [facility] Expiration Date 04/15/22   PT Treatment Day 2   Plan   PT Frequency 4-6x/wk   Recommendation   PT Discharge Recommendation Post acute rehabilitation services   Additional Comments Patient education completed for THPs - verbalizes understanding however memory/cog deficit limiting factor into retention  Cues/reminders also needed to ensure no use of L UE during activity  THPs and NWB L UE maintained throughout tx session  AM-PAC Basic Mobility Inpatient   Turning in Bed Without Bedrails 2   Lying on Back to Sitting on Edge of Flat Bed 1   Moving Bed to Chair 1   Standing Up From Chair 1   Walk in Room 1   Climb 3-5 Stairs 1   Basic Mobility Inpatient Raw Score 7   Turning Head Towards Sound 3   Follow Simple Instructions 2   Low Function Basic Mobility Raw Score 12   Low Function Basic Mobility Standardized Score 18 33   Highest Level Of Mobility   -[facility] Goal 2: Bed activities/Dependent transfer   -[facility] Highest Level of Mobility 3: Sit at edge of bed   -[facility] Goal Achieved Yes   Education   Education Provided Precautions for total hip arthroplasty (AC)   Patient Reinforcement needed   End of Consult   Patient Position at End of Consult Bedside chair; All needs within reach;Bed/Chair alarm activated   End of Consult Comments Pt's son present  Call bell within reach  Set up with lunch & son assisting pt to eat due to L UE being in sling  The patient's AM-PAC Basic Mobility Inpatient Short Form Raw Score is 7  A Raw score of less than or equal to 16 suggests the patient may benefit from discharge to post-acute rehabilitation services  Please also refer to the recommendation of the Physical Therapist for safe discharge planning  Assessment:  Patient overall tolerated tx session well  She does continue to be limited by NWB status of L UE, fear of falling, pain, THPs, cognitive status, & strength deficits of L LE  She did well using the Quick Move to transfer from EOB into recliner chair with min A x 2    Patient also tolerated ambulating 4 feet using R rail of bed with some encouragement & verbal cues for sequencing, weight shifting, etc   She will continue to benefit from PT services and will benefit from inpt Acute rehab in hopes for a sustainable transition home with support/services from rehab        Sophie Neri, PT,DPT

## 2022-04-04 VITALS
BODY MASS INDEX: 24.02 KG/M2 | DIASTOLIC BLOOD PRESSURE: 92 MMHG | TEMPERATURE: 98.8 F | OXYGEN SATURATION: 95 % | HEART RATE: 78 BPM | SYSTOLIC BLOOD PRESSURE: 131 MMHG | HEIGHT: 65 IN | WEIGHT: 144.18 LBS | RESPIRATION RATE: 20 BRPM

## 2022-04-04 LAB
FLUAV RNA RESP QL NAA+PROBE: NEGATIVE
FLUBV RNA RESP QL NAA+PROBE: NEGATIVE
RSV RNA RESP QL NAA+PROBE: NEGATIVE
SARS-COV-2 RNA RESP QL NAA+PROBE: NEGATIVE

## 2022-04-04 PROCEDURE — 99239 HOSP IP/OBS DSCHRG MGMT >30: CPT | Performed by: INTERNAL MEDICINE

## 2022-04-04 PROCEDURE — 0241U HB NFCT DS VIR RESP RNA 4 TRGT: CPT | Performed by: INTERNAL MEDICINE

## 2022-04-04 PROCEDURE — 99024 POSTOP FOLLOW-UP VISIT: CPT | Performed by: PHYSICIAN ASSISTANT

## 2022-04-04 RX ADMIN — AMLODIPINE BESYLATE 5 MG: 5 TABLET ORAL at 08:31

## 2022-04-04 RX ADMIN — ATORVASTATIN CALCIUM 20 MG: 20 TABLET, FILM COATED ORAL at 08:31

## 2022-04-04 RX ADMIN — DIGOXIN 125 MCG: 125 TABLET ORAL at 08:31

## 2022-04-04 RX ADMIN — APIXABAN 2.5 MG: 2.5 TABLET, FILM COATED ORAL at 08:31

## 2022-04-04 RX ADMIN — METOPROLOL SUCCINATE 100 MG: 100 TABLET, EXTENDED RELEASE ORAL at 08:31

## 2022-04-04 RX ADMIN — ACETAMINOPHEN 325MG 650 MG: 325 TABLET ORAL at 05:25

## 2022-04-04 NOTE — PROGRESS NOTES
Orthopedics   Darwin Reyna 80 y o  female MRN: [id number]  Unit/Bed#:       Subjective:  80 y  o female post operative day 3 left hip hemiarthroplasty with left proximal humerus fracturet  Pt doing well  Pain controlled  Patient reports a good appetite and decreased hip pain  Awaiting placement      Labs:  0   Lab Value Date/Time    HCT 33 5 (L) 04/03/2022 0616    HCT 27 9 (L) 04/02/2022 0615    HCT 33 2 (L) 04/01/2022 0507    HGB 11 6 04/03/2022 0616    HGB 9 8 (L) 04/02/2022 0615    HGB 11 6 04/01/2022 0507    INR 1 33 (H) 06/19/2021 2342    WBC 8 78 04/02/2022 0615    WBC 8 90 04/01/2022 0507    WBC 11 01 (H) 03/31/2022 0405     Meds:    Current Facility-Administered Medications:     acetaminophen (TYLENOL) tablet 650 mg, 650 mg, Oral, Q6H PRN, Patrice Mims PA-C, 650 mg at 04/04/22 0525    aluminum-magnesium hydroxide-simethicone (MYLANTA) oral suspension 30 mL, 30 mL, Oral, Q6H PRN, aKtya Wayne PA-C    amLODIPine (NORVASC) tablet 5 mg, 5 mg, Oral, Daily, Patrice Mims PA-C, 5 mg at 04/04/22 0831    apixaban (ELIQUIS) tablet 2 5 mg, 2 5 mg, Oral, BID, Patrice Mims PA-C, 2 5 mg at 04/04/22 0831    atorvastatin (LIPITOR) tablet 20 mg, 20 mg, Oral, Daily, Patrice Mims PA-C, 20 mg at 04/04/22 0831    digoxin (LANOXIN) tablet 125 mcg, 125 mcg, Oral, Daily, Patrice Mims PA-C, 125 mcg at 04/04/22 0831    lactated ringers infusion, 125 mL/hr, Intravenous, Continuous, Patrice Mims PA-C, Stopped at 04/03/22 0242    metoprolol succinate (TOPROL-XL) 24 hr tablet 100 mg, 100 mg, Oral, Daily, Patrice Mims PA-C, 100 mg at 04/04/22 7105    morphine injection 2 mg, 2 mg, Intravenous, Q4H PRN, Patrice Mims PA-C    naloxone (NARCAN) 0 04 mg/mL syringe 0 04 mg, 0 04 mg, Intravenous, Q1MIN PRN, Patrice Mims PA-C    ondansetron [facility]) injection 4 mg, 4 mg, Intravenous, Q6H PRN, Katya Wayne PA-C, 4 mg at 03/31/22 [facility]    oxyCODONE (ROXICODONE) IR tablet 2 5 mg, 2 5 mg, Oral, Q6H PRN, Katya Hero DEVORAH Wayne, 2 5 mg at 03/30/22 1205    polyethylene glycol (MIRALAX) packet 17 g, 17 g, Oral, Daily PRN, Angella Palencia PA-C    [facility]) tablet 8 6 mg, 1 tablet, Oral, HS PRN, Angella Palencia PA-C    Blood Culture:   No results found for: BLOODCX    Wound Culture:   No results found for: WOUNDCULT    Ins and Outs:  I/O last 24 hours: In: 360 [P O :360]  Out: 750 [Urine:750]    Physical:  Vitals:    04/04/22 0831   BP:    Pulse:    Resp:    Temp:    SpO2: 93%     left lower extremity  · Dressing clean dry intact  Thigh soft compartments soft  · Sensation intact L2-S1  · Motor intact to knee flexion/extension, EHL/FHL  · 2+ DP pulse  Left upper extremity:  Extensive ecchymosis with no gross outward deformity  Patient able of flex and extend the fingers wrist and elbow slightly  She has good pronation supination  Assessment: 80 y  o female post operative day 3 left hip hemiarthroplasty with left proximal humerus fracture treated conservatively  Plan:  · Up and out of bed  · Weightbearing as tolerated left lower extremity and nonweightbearing left upper extremity  · PT/OT follow-up posterior hip precautions for the next 6 weeks  · To follow-up with Dr Liz Jessica in 2 weeks in orthopedic office  · DVT prophylaxis with Eliquis  · Analgesics as needed for pain  · Dispo: Dale Pierce for discharge from ortho perspective to post acute rehab when medically cleared        Jammie Robles PA-C

## 2022-04-04 NOTE — DISCHARGE SUMMARY
[address]  Discharge- Concepción Click 2/19/1925, 80 y o  female MRN: [id number]  Unit/Bed#: -01 Encounter: [id number]  Primary Care Provider: Robyn Chun MD   Date and time admitted to hospital: 3/29/2022  6:45 PM    * Transcervical fracture of left femur, closed, initial encounter [facility])  Assessment & Plan  - status post mechanical fall and subsequent left femur fracture    - status post left hip hemiarthroplasty with Dr Cally Turner on 03/31  - cont home Eliquis for DVT prophylaxis  - PT/OT eval  - will need rehab on discharge        Closed comminuted fracture of left humerus  Assessment & Plan  - status post mechanical fall with trauma to the left side  - CT left upper extremity confirms left humerus fracture  - nonweightbearing status and sling per Orthopedic surgery  No surgical intervention required  Acute blood loss anemia  Assessment & Plan  - drop in hemoglobin status post arthroplasty  - no obvious outward signs or symptoms of bleeding  - continue to monitor daily hemoglobin    Hyponatremia  Assessment & Plan  - mild hyponatremia on admission, suspect secondary to decreased p o   Intake  - improving without intervention  - daily BMP    Atrial fibrillation (HCC)  Assessment & Plan  - Continue rate control with digoxin, metoprolol  - resume Eliquis for stroke ppx    Hypertensive urgency  Assessment & Plan  - blood pressure much better control now back on home Norvasc and metoprolol  - continue to monitor and titrate as necessary    Elevated troponin-resolved as of 3/31/2022  Assessment & Plan  - Troponin 11, 32, 70  - no ischemic changes on EKG and patient without complaint  - this is non MI related elevation in troponin, no further workup indicated at this time        Medical Problems             Resolved Problems  Date Reviewed: 4/4/2022          Resolved    Elevated troponin 3/31/2022     Resolved by  Eleazar Albrecht DO              Discharging Physician / Practitioner: Elina Valle DO  PCP: Eugene Sheth MD  Admission Date:   Admission Orders (From admission, onward)     Ordered        03/29/22 2107  Inpatient Admission  Once                      Discharge Date: 04/04/22    Consultations During Hospital Stay:  · Orthopedic Surgery    Procedures Performed:   · Left hip Hemiarthoplasty 3/31    Significant Findings / Test Results:   · Left Hip fracture  · Comminuted left humerous fracture    Incidental Findings:   · none    Test Results Pending at Discharge (will require follow up):   · none     Outpatient Tests Requested:  · none    Complications:  none    Reason for Admission:  Left Hip fracture, left humeral fracture    Hospital Course:   Ghada Peck is a 80 y o  female patient who originally presented to the hospital on 3/29/2022 after suffering a mechanical fall at her home residence  In the emergency room she was found to have a comminuted left humeral fracture as well as a left hip fracture  She was seen by Orthopedic surgery  The humeral fracture was deemed non operative and she will be treated conservatively with nonweightbearing and a sling  She underwent a left hip hemiarthroplasty without complication on 24/54  She did have mild acute blood loss anemia following the surgery though did not require transfusion  She will be discharged to SNF for continued rehabilitation on discharge  Otherwise all questions and concerns were addressed  She was eager to transition to rehab  Please see above list of diagnoses and related plan for additional information  Condition at Discharge: good    Discharge Day Visit / Exam:   Subjective:  Patient seen and examined on the day of discharge  Feeling well today  Pain is controlled    Agreeable to discharge plan  Vitals: Blood Pressure: 138/71 (04/04/22 0700)  Pulse: 84 (04/04/22 0700)  Temperature: 98 5 °F (36 9 °C) (04/04/22 0700)  Temp Source: Temporal (04/04/22 0700)  Respirations: 20 (04/04/22 0700)  Height: 5' 5" (165 1 cm) (03/31/22 0901)  Weight - Scale: 65 4 kg (144 lb 2 9 oz) (04/03/22 0600)  SpO2: 93 % (04/04/22 0831)    PHYSICAL EXAM:    Vitals signs reviewed  Constitutional   Awake and cooperative  NAD  Head/Neck   Normocephalic  Atraumatic  HEENT   No scleral icterus  EOMI  Heart   Regular rate and rhythm  No murmers  Lungs   Clear to auscultation bilaterally  Respirations unlaboured  Abdomen   Soft  Nontender  Nondistended  Skin   Skin color normal  No rashes  Extremities   No deformities  Left upper extremity in sling  Left lower extremity incision sites clean and dry  Neuro   Alert and oriented  No new deficits  Psych   Mood stable  Affect normal          Discussion with Family: Updated  (son) at bedside  Discharge instructions/Information to patient and family:   See after visit summary for information provided to patient and family  Provisions for Follow-Up Care:  See after visit summary for information related to follow-up care and any pertinent home health orders  Disposition:   Other: SNF for rehab    Planned Readmission: NO     Discharge Statement:  I spent 40 minutes discharging the patient  This time was spent on the day of discharge  I had direct contact with the patient on the day of discharge  Greater than 50% of the total time was spent examining patient, answering all patient questions, arranging and discussing plan of care with patient as well as directly providing post-discharge instructions  Additional time then spent on discharge activities  Discharge Medications:  See after visit summary for reconciled discharge medications provided to patient and/or family        **Please Note: This note may have been constructed using a voice recognition system**

## 2022-04-04 NOTE — CASE MANAGEMENT
Case Management Discharge Planning Note    Patient name Carli Lucas  Location /-40 MRN [id number]  : 1925 Date 2022       Current Admission Date: 3/29/2022  Current Admission Diagnosis:Transcervical fracture of left femur, closed, initial encounter [facility])   Patient Active Problem List    Diagnosis Date Noted    Acute blood loss anemia 2022    Closed comminuted fracture of left humerus 2022    Fall at home 2022    Transcervical fracture of left femur, closed, initial encounter ([facility] [address] ) 2022    Hyponatremia 2022    Hypertensive urgency     Atrial fibrillation ([facility] [address] )       LOS (days): 6  Geometric Mean LOS (GMLOS) (days): 2 90  Days to GMLOS:-2 8     OBJECTIVE:  Risk of Unplanned Readmission Score: 14         Current admission status: Inpatient   Pref[address] Pharmacy:    [address]  [address] [facility] [address]  Phone: [phone] Fax: [phone] AID-[id number]  [facility] [address] [place] [id number]  Phone: [phone] Fax: [phone]    [facility] [[email or web address] AID-[id number], [place], [address]  [address]  [address] [facility] [address]  Phone: [phone] Fax: [phone]    Primary Care Provider: Leo Fabry, MD    Primary Insurance: MEDICARE  Secondary Insurance: AARP    DISCHARGE DETAILS:  Patient is medically cleared for dc  [facility] ( [facility]) can accept today  COVID was negative  Pt and son were updated at the bedside of dc  Transport secured for 1600 with Charlie HARRELL  Medical Necessity was done  CM updated [facility] and Nursing of dc time  Transported by [employer] and Unit #):  [id number] [facility] Name, [address] [facility]  Receiving Facility/Agency Phone Number: [phone]  Facility/Agency Fax Number: [phone]

## 2022-04-04 NOTE — PLAN OF CARE
Problem: MOBILITY - ADULT  Goal: Maintain or return to baseline ADL function  Description: INTERVENTIONS:  -  Assess patient's ability to carry out ADLs; assess patient's baseline for ADL function and identify physical deficits which impact ability to perform ADLs (bathing, care of mouth/teeth, toileting, grooming, dressing, etc )  - Assess/evaluate cause of self-care deficits   - Assess range of motion  - Assess patient's mobility; develop plan if impaired  - Assess patient's need for assistive devices and provide as appropriate  - Encourage maximum independence but intervene and supervise when necessary  - Involve family in performance of ADLs  - Assess for home care needs following discharge   - Consider OT consult to assist with ADL evaluation and planning for discharge  - Provide patient education as appropriate  Outcome: Progressing  Goal: Maintains/Returns to pre admission functional level  Description: INTERVENTIONS:  - Perform BMAT or MOVE assessment daily    - Set and communicate daily mobility goal to care team and patient/family/caregiver  - Collaborate with rehabilitation services on mobility goals if consulted  - Perform Range of Motion 3 times a day  - Reposition patient every 2 hours    - Dangle patient 3 times a day  - Stand patient 3 times a day  - Ambulate patient 3 times a day  - Out of bed to chair 3 times a day   - Out of bed for meals 3 times a day  - Out of bed for toileting  - Record patient progress and toleration of activity level   Outcome: Progressing     Problem: Prexisting or High Potential for Compromised Skin Integrity  Goal: Skin integrity is maintained or improved  Description: INTERVENTIONS:  - Identify patients at risk for skin breakdown  - Assess and monitor skin integrity  - Assess and monitor nutrition and hydration status  - Monitor labs   - Assess for incontinence   - Turn and reposition patient  - Assist with mobility/ambulation  - Relieve pressure over bony prominences  - Avoid friction and shearing  - Provide appropriate hygiene as needed including keeping skin clean and dry  - Evaluate need for skin moisturizer/barrier cream  - Collaborate with interdisciplinary team   - Patient/family teaching  - Consider wound care consult   Outcome: Progressing     Problem: Potential for Falls  Goal: Patient will remain free of falls  Description: INTERVENTIONS:  - Educate patient/family on patient safety including physical limitations  - Instruct patient to call for assistance with activity   - Consult OT/PT to assist with strengthening/mobility   - Keep Call bell within reach  - Keep bed low and locked with side rails adjusted as appropriate  - Keep care items and personal belongings within reach  - Initiate and maintain comfort rounds  - Make Fall Risk Sign visible to staff  - Offer Toileting every 2 Hours, in advance of need  - Initiate/Maintain bed alarm  - Obtain necessary fall risk management equipment: nonskid footwear  - Apply yellow socks and bracelet for high fall risk patients  - Consider moving patient to room near nurses station  Outcome: Progressing     Problem: PAIN - ADULT  Goal: Verbalizes/displays adequate comfort level or baseline comfort level  Description: Interventions:  - Encourage patient to monitor pain and request assistance  - Assess pain using appropriate pain scale  - Administer analgesics based on type and severity of pain and evaluate response  - Implement non-pharmacological measures as appropriate and evaluate response  - Consider cultural and social influences on pain and pain management  - Notify physician/advanced practitioner if interventions unsuccessful or patient reports new pain  Outcome: Progressing     Problem: INFECTION - ADULT  Goal: Absence or prevention of progression during hospitalization  Description: INTERVENTIONS:  - Assess and monitor for signs and symptoms of infection  - Monitor lab/diagnostic results  - Monitor all insertion sites, i e  indwelling lines, tubes, and drains  - Monitor endotracheal if appropriate and nasal secretions for changes in amount and color  - Speed appropriate cooling/warming therapies per order  - Administer medications as ordered  - Instruct and encourage patient and family to use good hand hygiene technique  - Identify and instruct in appropriate isolation precautions for identified infection/condition  Outcome: Progressing  Goal: Absence of fever/infection during neutropenic period  Description: INTERVENTIONS:  - Monitor WBC    Outcome: Progressing     Problem: SAFETY ADULT  Goal: Maintain or return to baseline ADL function  Description: INTERVENTIONS:  -  Assess patient's ability to carry out ADLs; assess patient's baseline for ADL function and identify physical deficits which impact ability to perform ADLs (bathing, care of mouth/teeth, toileting, grooming, dressing, etc )  - Assess/evaluate cause of self-care deficits   - Assess range of motion  - Assess patient's mobility; develop plan if impaired  - Assess patient's need for assistive devices and provide as appropriate  - Encourage maximum independence but intervene and supervise when necessary  - Involve family in performance of ADLs  - Assess for home care needs following discharge   - Consider OT consult to assist with ADL evaluation and planning for discharge  - Provide patient education as appropriate  Outcome: Progressing  Goal: Maintains/Returns to pre admission functional level  Description: INTERVENTIONS:  - Perform BMAT or MOVE assessment daily    - Set and communicate daily mobility goal to care team and patient/family/caregiver     - Collaborate with rehabilitation services on mobility goals if consulted    - Out of bed for toileting  - Record patient progress and toleration of activity level   Outcome: Progressing  Goal: Patient will remain free of falls  Description: INTERVENTIONS:  - Educate patient/family on patient safety including physical limitations  - Instruct patient to call for assistance with activity   - Consult OT/PT to assist with strengthening/mobility   - Keep Call bell within reach  - Keep bed low and locked with side rails adjusted as appropriate  - Keep care items and personal belongings within reach  - Initiate and maintain comfort rounds  - Make Fall Risk Sign visible to staff    - Apply yellow socks and bracelet for high fall risk patients  - Consider moving patient to room near nurses station  Outcome: Progressing     Problem: DISCHARGE PLANNING  Goal: Discharge to home or other facility with appropriate resources  Description: INTERVENTIONS:  - Identify barriers to discharge w/patient and caregiver  - Arrange for needed discharge resources and transportation as appropriate  - Identify discharge learning needs (meds, wound care, etc )  - Arrange for interpretive services to assist at discharge as needed  - Refer to Case Management Department for coordinating discharge planning if the patient needs post-hospital services based on physician/advanced practitioner order or complex needs related to functional status, cognitive ability, or social support system  Outcome: Progressing     Problem: Knowledge Deficit  Goal: Patient/family/caregiver demonstrates understanding of disease process, treatment plan, medications, and discharge instructions  Description: Complete learning assessment and assess knowledge base    Interventions:  - Provide teaching at level of understanding  - Provide teaching via preferred learning methods  Outcome: Progressing

## 2022-04-18 ENCOUNTER — HOSPITAL ENCOUNTER (OUTPATIENT)
Dept: RADIOLOGY | Facility: CLINIC | Age: 87
Discharge: HOME | End: 2022-04-18
Payer: COMMERCIAL

## 2022-04-18 ENCOUNTER — OFFICE VISIT (OUTPATIENT)
Dept: OBGYN CLINIC | Facility: CLINIC | Age: 87
End: 2022-04-18

## 2022-04-18 VITALS
HEART RATE: 74 BPM | WEIGHT: 144 LBS | SYSTOLIC BLOOD PRESSURE: 130 MMHG | DIASTOLIC BLOOD PRESSURE: 70 MMHG | BODY MASS INDEX: 23.99 KG/M2 | HEIGHT: 65 IN | TEMPERATURE: 96.9 F

## 2022-04-18 DIAGNOSIS — Z47.1 AFTERCARE FOLLOWING LEFT HIP JOINT REPLACEMENT SURGERY: ICD-10-CM

## 2022-04-18 DIAGNOSIS — Z96.642 AFTERCARE FOLLOWING LEFT HIP JOINT REPLACEMENT SURGERY: ICD-10-CM

## 2022-04-18 DIAGNOSIS — S72.032D CLOSED DISPLACED MIDCERVICAL FRACTURE OF LEFT FEMUR WITH ROUTINE HEALING: ICD-10-CM

## 2022-04-18 DIAGNOSIS — S42.225D CLOSED 2-PART NONDISPLACED FRACTURE OF SURGICAL NECK OF LEFT HUMERUS WITH ROUTINE HEALING, SUBSEQUENT ENCOUNTER: ICD-10-CM

## 2022-04-18 DIAGNOSIS — S42.225D CLOSED 2-PART NONDISPLACED FRACTURE OF SURGICAL NECK OF LEFT HUMERUS WITH ROUTINE HEALING, SUBSEQUENT ENCOUNTER: Primary | ICD-10-CM

## 2022-04-18 PROCEDURE — 1124F ACP DISCUSS-NO DSCNMKR DOCD: CPT | Performed by: ORTHOPAEDIC SURGERY

## 2022-04-18 PROCEDURE — 73030 X-RAY EXAM OF SHOULDER: CPT

## 2022-04-18 PROCEDURE — 99024 POSTOP FOLLOW-UP VISIT: CPT | Performed by: ORTHOPAEDIC SURGERY

## 2022-04-18 RX ORDER — MAGNESIUM HYDROXIDE/ALUMINUM HYDROXICE/SIMETHICONE 120; 1200; 1200 MG/30ML; MG/30ML; MG/30ML
30 SUSPENSION ORAL EVERY 6 HOURS PRN
COMMUNITY
Start: 2022-04-14

## 2022-04-18 RX ORDER — ACETAMINOPHEN 500 MG
500 TABLET ORAL
COMMUNITY
Start: 2022-04-14 | End: 2022-04-24

## 2022-04-18 RX ORDER — FUROSEMIDE 20 MG/1
20 TABLET ORAL 2 TIMES DAILY
COMMUNITY

## 2022-04-18 RX ORDER — DOCUSATE SODIUM 100 MG/1
100 CAPSULE, LIQUID FILLED ORAL 2 TIMES DAILY
COMMUNITY

## 2022-04-18 RX ORDER — BALSAM PERU/CASTOR OIL
OINTMENT (GRAM) TOPICAL
COMMUNITY

## 2022-04-18 NOTE — PROGRESS NOTES
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         Plan     1  I would recommend follow-up in 3 weeks  Therapy may now work on gentle pendulum exercises and gentle passive range of motion left shoulder  She would be permitted to use the left upper extremity for daily care activities but should not be bearing weight through the left upper extremity yet  X-rays in the AP and axillary views will be obtained at follow-up in 3 weeks  Return in about 3 weeks (around 5/9/2022)  Subjective   Solis Aldana returns for follow-up of her proximal left humerus and left hip fractures  The patient is 3 week(s) post hemiarthroplasty and closed treatment of left proximal humerus and returns for routine follow-up  Patient Is unable to provide any significant historical information but states she is not being ambulated  She denies pain left hip  She does note some pain left shoulder  Objective     /70   Pulse 74   Temp (!) 96 9 °F (36 1 °C) (Temporal)   Ht 5' 5" (1 651 m)   Wt 65 3 kg (144 lb)   BMI 23 96 kg/m²     Exam demonstrates a left shoulder he with resolving ecchymosis and notably less swelling than when in the hospital   She does complain of tenderness with palpation and has limited range of motion consistent with her early recovery state  The left hip exam demonstrates the Mepilex in place  This was removed without difficulty  The incision is benign  There is no swelling or, ecchymosis or erythema  She denies complaints with left hip range of motion        Data Review   X-rays of her shoulder demonstrated the fracture to remain in acceptable alignment with minimal varus noted on the AP view     Isac Blackwood

## 2022-04-19 ENCOUNTER — TELEPHONE (OUTPATIENT)
Dept: OBGYN CLINIC | Facility: HOSPITAL | Age: 87
End: 2022-04-19

## 2022-04-19 NOTE — TELEPHONE ENCOUNTER
Dr Madison Cazares    Patient was sent home with another patient's OVN       Please fax hers to [facility] from [facility]   Fax # [phone]

## (undated) DEVICE — 4-PORT MANIFOLD: Brand: NEPTUNE 2

## (undated) DEVICE — HIP ACCESS KIT HIP ACCESS KIT, HIP 17G .058 X 6INCHES (1.5 X 152MM) NEEDLE, GUIDE WIRE .045 X 16INCHES (1.1 X 406MM) ROUND END, SURGICAL MARKER, SURGICAL RULE, SYRINGE 30ML

## (undated) DEVICE — SUT VICRYL 2-0 CP-1 27 IN J266H

## (undated) DEVICE — GAUZE SPONGES,16 PLY: Brand: CURITY

## (undated) DEVICE — GLOVE SRG BIOGEL 7.5

## (undated) DEVICE — ELECTRODE BLADE MOD  E-Z CLEAN 6.5IN -0014M

## (undated) DEVICE — GLOVE INDICATOR UNDERGLOVE SZ 7.5 GREEN

## (undated) DEVICE — CHLORAPREP HI-LITE 26ML ORANGE

## (undated) DEVICE — DUAL CUT SAGITTAL BLADE

## (undated) DEVICE — 3M™ STERI-DRAPE™ U-DRAPE 1015: Brand: STERI-DRAPE™

## (undated) DEVICE — HEAVY DUTY TABLE COVER: Brand: CONVERTORS

## (undated) DEVICE — FEMORAL CANAL TIP: Brand: SIMPULSE

## (undated) DEVICE — ADHESIVE SKIN HIGH VISCOSITY EXOFIN 1ML

## (undated) DEVICE — SYRINGE 20ML LL

## (undated) DEVICE — COBAN 6 IN STERILE

## (undated) DEVICE — HOOD: Brand: FLYTE, SURGICOOL

## (undated) DEVICE — 3M™ IOBAN™ 2 ANTIMICROBIAL INCISE DRAPE 6651EZ: Brand: IOBAN™ 2

## (undated) DEVICE — 3M™ DURAPORE™ SURGICAL TAPE 1538-3, 3 INCH X 10 YARD (7,5CM X 9,1M), 4 ROLLS/BOX: Brand: 3M™ DURAPORE™

## (undated) DEVICE — STOCKINETTE,IMPERVIOUS,12X48,STERILE: Brand: MEDLINE

## (undated) DEVICE — CAPIT HIP STEM CEMENT PRIMARY

## (undated) DEVICE — SUT VICRYL 2-0 CT-1 27 IN J259H

## (undated) DEVICE — CEMENT MIXING TOWER

## (undated) DEVICE — 3M™ IOBAN™ 2 ANTIMICROBIAL INCISE DRAPE 6650EZ: Brand: IOBAN™ 2

## (undated) DEVICE — NEEDLE 18 G X 1 1/2 SAFETY

## (undated) DEVICE — BETHLEHEM TOTAL HIP, KIT: Brand: CARDINAL HEALTH

## (undated) DEVICE — CAPIT HIP BIPOLAR HEAD POR PRIMARY

## (undated) DEVICE — NEEDLE SPINAL18G X 3.5 IN QUINCKE

## (undated) DEVICE — MAYO STAND COVER: Brand: CONVERTORS

## (undated) DEVICE — SUT VICRYL 0 CT-1 27 IN J260H

## (undated) DEVICE — PLUMEPEN PRO 10FT

## (undated) DEVICE — DRESSING MEPILEX AG BORDER POST-OP 4 X 12 IN

## (undated) DEVICE — DRAPE EQUIPMENT RF WAND